# Patient Record
Sex: FEMALE | Race: OTHER
[De-identification: names, ages, dates, MRNs, and addresses within clinical notes are randomized per-mention and may not be internally consistent; named-entity substitution may affect disease eponyms.]

---

## 2020-05-19 ENCOUNTER — HOSPITAL ENCOUNTER (EMERGENCY)
Dept: HOSPITAL 47 - EC | Age: 80
Discharge: HOME | End: 2020-05-19
Payer: MEDICARE

## 2020-05-19 VITALS — SYSTOLIC BLOOD PRESSURE: 178 MMHG | HEART RATE: 94 BPM | DIASTOLIC BLOOD PRESSURE: 90 MMHG | TEMPERATURE: 98.6 F

## 2020-05-19 VITALS — RESPIRATION RATE: 18 BRPM
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
## 2020-05-19 DIAGNOSIS — Z23: ICD-10-CM

## 2020-05-19 DIAGNOSIS — S61.411A: ICD-10-CM

## 2020-05-19 DIAGNOSIS — S41.151A: Primary | ICD-10-CM

## 2020-05-19 DIAGNOSIS — Z88.5: ICD-10-CM
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
## 2020-05-19 DIAGNOSIS — S70.01XA: ICD-10-CM

## 2020-05-19 DIAGNOSIS — Z29.14: ICD-10-CM
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
## 2020-05-19 PROCEDURE — 12002 RPR S/N/AX/GEN/TRNK2.6-7.5CM: CPT

## 2020-05-19 PROCEDURE — 96372 THER/PROPH/DIAG INJ SC/IM: CPT

## 2020-05-19 PROCEDURE — 90675 RABIES VACCINE IM: CPT

## 2020-05-19 PROCEDURE — 90471 IMMUNIZATION ADMIN: CPT

## 2020-05-19 PROCEDURE — 73502 X-RAY EXAM HIP UNI 2-3 VIEWS: CPT

## 2020-05-19 PROCEDURE — 99283 EMERGENCY DEPT VISIT LOW MDM: CPT

## 2020-05-19 PROCEDURE — 90715 TDAP VACCINE 7 YRS/> IM: CPT

## 2020-05-19 PROCEDURE — 73130 X-RAY EXAM OF HAND: CPT

## 2020-05-19 PROCEDURE — 90375 RABIES IG IM/SC: CPT

## 2020-05-19 NOTE — XR
EXAMINATION TYPE: XR hand complete RT

 

DATE OF EXAM: 5/19/2020

 

COMPARISON: None

 

HISTORY: Puncture wounds from Cats

 

TECHNIQUE: Three-view right hand

 

FINDINGS: No radiopaque foreign bodies are evident. No acute fractures or dislocations are evident. T
here is soft tissue swelling and some injury along the dorsum of the hand.

 

IMPRESSION:

1.  No acute osseous abnormality right hand.

2. Soft tissue swelling in injury along the dorsum of the hand.

## 2020-05-19 NOTE — XR
EXAMINATION TYPE: XR Hip RT and AP Pelvis

 

DATE OF EXAM: 5/19/2020

 

COMPARISON: None

 

HISTORY: Fall, pain

 

TECHNIQUE: 2 view right hip supplemented with AP pelvis

 

FINDINGS: Femoral heads articulate with the acetabulum. Mild joint space narrowing is present. Symphy
sis pubis and sacroiliac joints are intact. Degenerative changes are at sacroiliac joints.

 

No acute fracture at the right hip is evident.

 

IMPRESSION:

1.  No acute osseous abnormality right hip

## 2020-05-19 NOTE — ED
Wound/Laceration HPI





- General


Chief Complaint: Wound/Laceration


Stated Complaint: Hand laceration


Time Seen by Provider: 05/19/20 10:21


Source: patient, RN notes reviewed


Mode of arrival: ambulatory


Limitations: no limitations





- History of Present Illness


Initial Comments: 





This is a 71-year-old female sent emergency Department chief complaint of fall, 

Bite and scratches.  Patient states she heard a noise around 7 AM this morning 

patient states that she went out slipped on her deck fell to her right side.  

She has minimal right hip pain she is able to family.  Denies any head injury no

loss conscious.  She states she fell rate in the midst of her cat and feral cat 

fighting.  Patient states that she has multiple bite marks, scratch marks to her

right arm.  Patient denies any chest pain or shortness breath.  Patient's unsure

when her last tetanus was.  Patient states that she did thoroughly clean out the

wound.





- Related Data


                                  Previous Rx's











 Medication  Instructions  Recorded


 


Amoxicillin/Potassium Clav 1 tab PO Q12HR #20 tab 05/19/20





[Augmentin 875-125 Tablet]  











                                    Allergies











Allergy/AdvReac Type Severity Reaction Status Date / Time


 


codeine Allergy  Confusion Verified 05/19/20 10:20














Review of Systems


ROS Statement: 


Those systems with pertinent positive or pertinent negative responses have been 

documented in the HPI.





ROS Other: All systems not noted in ROS Statement are negative.





Past Medical History


Past Medical History: Hypertension


History of Any Multi-Drug Resistant Organisms: None Reported


Past Surgical History: Appendectomy, Hysterectomy


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 

kidney stones


Smoking Status: Never smoker


Past Alcohol Use History: None Reported


Past Drug Use History: None Reported





General Exam


Limitations: no limitations


General appearance: alert, in no apparent distress


Head exam: Present: atraumatic, normocephalic, normal inspection


Eye exam: Present: normal appearance, PERRL, EOMI.  Absent: scleral icterus, 

conjunctival injection, periorbital swelling


ENT exam: Present: normal exam, normal oropharynx, mucous membranes moist


Neck exam: Present: normal inspection.  Absent: tenderness, meningismus, 

lymphadenopathy


Respiratory exam: Present: normal lung sounds bilaterally.  Absent: respiratory 

distress, wheezes, rales, rhonchi, stridor


Cardiovascular Exam: Present: regular rate, normal rhythm, normal heart sounds. 

Absent: systolic murmur, diastolic murmur, rubs, gallop, clicks


Extremities exam: Present: other (Right hand there are 3 lacerations to 

approximately 3 cm, and two 2 cm there is minimal bleeding at this time there 

are multiple puncture wounds, scratch marks noted over the right forearm no 

active bleeding patient has full range of motion of the right hand, right 

forearm mild tenderness the right hand neurovascular intact, right hip there is 

minimal tenderness on the posterior aspect, she does have full range of motion 

she is able to ambulate with minimal difficulty.  Neurovascular intact remaining

extremity exam within normal limits.)


Back exam: Present: full ROM.  Absent: tenderness, paraspinal tenderness, 

vertebral tenderness


Neurological exam: Present: alert, oriented X3, CN II-XII intact, reflexes 

normal.  Absent: motor sensory deficit


Skin exam: Present: warm, dry, intact, normal color.  Absent: rash





Course


                                   Vital Signs











  05/19/20





  10:11


 


Temperature 97.9 F


 


Pulse Rate 104 H


 


Respiratory 18





Rate 


 


Blood Pressure 167/82


 


O2 Sat by Pulse 98





Oximetry 














Procedures





- Laceration


  ** Laceration #1


Consent Obtained: verbal consent


Indication: laceration


Site: hand (Right hand)


Size (cm): 3


Description: irregular


Depth: simple, single layer


Anesthetic Used: lidocaine 1%, without epi


Anesthesia Technique: local infiltration


Amount (mls): 3


Pre-repair: wound explored, irrigated extensively, deep structures intact


Type of Sutures: nylon


Size of Sutures: 4-0


Number of Sutures: 3


Technique: simple, interrupted


Patient Tolerated Procedure: well, no complications





  ** Laceration #2


Consent Obtained: verbal consent


Indication: laceration


Site: hand (Right hand)


Size (cm): 2


Description: linear


Depth: simple, single layer


Anesthetic Used: lidocaine 1%, without epi


Anesthesia Technique: local infiltration


Amount (mls): 3


Pre-repair: wound explored, irrigated extensively


Type of Sutures: nylon


Size of Sutures: 4-0


Number of Sutures: 2


Technique: simple, interrupted, running


Patient Tolerated Procedure: well, no complications





  ** Laceration #3


Consent Obtained: verbal consent


Indication: laceration


Site: hand (Right hand)


Size (cm): 2


Description: linear


Depth: simple, single layer


Anesthetic Used: lidocaine 1%, without epi


Anesthesia Technique: local infiltration


Amount (mls): 3


Pre-repair: wound explored, irrigated extensively, deep structures intact


Type of Sutures: nylon


Size of Sutures: 4-0


Number of Sutures: 2


Technique: simple, interrupted


Patient Tolerated Procedure: well, no complications





Medical Decision Making





- Medical Decision Making


79-year-old female presented for fall, cat bite and scratches.  Her wounds were 

thoroughly cleaned, tetanus is updated, 3 lacerations were closed using sutures 

were loosely approximated.  Patient was given rabies vaccine and immunoglobulin.

 Patient was given prescription for additional days for repeat vaccine.  She was

placed on oral antibiotics.  She was given strict return parameters for signs 

and symptoms of infection.








Disposition


Clinical Impression: 


 Cat bite of hand, Laceration of right hand, Contusion of right hip





Disposition: HOME SELF-CARE


Condition: Stable


Instructions (If sedation given, give patient instructions):  Animal Bite (ED), 

Care For Your Stitches (DC)


Additional Instructions: 


Have sutures removed in 10-14 days.  Please return to the Emergency Department 

if symptoms worsen or any other concerns.


Prescriptions: 


Amoxicillin/Potassium Clav [Augmentin 875-125 Tablet] 1 tab PO Q12HR #20 tab


Is patient prescribed a controlled substance at d/c from ED?: No


Referrals: 


Katey Eason MD [Primary Care Provider] - 1-2 days


Time of Disposition: 11:47

## 2020-05-20 ENCOUNTER — HOSPITAL ENCOUNTER (INPATIENT)
Dept: HOSPITAL 47 - EC | Age: 80
LOS: 4 days | Discharge: HOME HEALTH | DRG: 603 | End: 2020-05-24
Attending: INTERNAL MEDICINE | Admitting: INTERNAL MEDICINE
Payer: COMMERCIAL

## 2020-05-20 DIAGNOSIS — L30.9: ICD-10-CM

## 2020-05-20 DIAGNOSIS — Z85.42: ICD-10-CM

## 2020-05-20 DIAGNOSIS — S61.451A: ICD-10-CM

## 2020-05-20 DIAGNOSIS — L40.9: ICD-10-CM

## 2020-05-20 DIAGNOSIS — I10: ICD-10-CM

## 2020-05-20 DIAGNOSIS — Z60.2: ICD-10-CM

## 2020-05-20 DIAGNOSIS — G43.909: ICD-10-CM

## 2020-05-20 DIAGNOSIS — Z79.82: ICD-10-CM

## 2020-05-20 DIAGNOSIS — Z87.891: ICD-10-CM

## 2020-05-20 DIAGNOSIS — Z87.442: ICD-10-CM

## 2020-05-20 DIAGNOSIS — M15.9: ICD-10-CM

## 2020-05-20 DIAGNOSIS — E78.5: ICD-10-CM

## 2020-05-20 DIAGNOSIS — Z11.59: ICD-10-CM

## 2020-05-20 DIAGNOSIS — Z86.19: ICD-10-CM
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
## 2020-05-20 DIAGNOSIS — Z82.3: ICD-10-CM

## 2020-05-20 DIAGNOSIS — S50.811A: ICD-10-CM

## 2020-05-20 DIAGNOSIS — Z90.710: ICD-10-CM

## 2020-05-20 DIAGNOSIS — E11.9: ICD-10-CM

## 2020-05-20 DIAGNOSIS — Z79.899: ICD-10-CM

## 2020-05-20 DIAGNOSIS — L03.113: Primary | ICD-10-CM

## 2020-05-20 DIAGNOSIS — J44.9: ICD-10-CM

## 2020-05-20 DIAGNOSIS — Z90.49: ICD-10-CM

## 2020-05-20 LAB
ANION GAP SERPL CALC-SCNC: 8 MMOL/L
BASOPHILS # BLD AUTO: 0.1 K/UL (ref 0–0.2)
BASOPHILS NFR BLD AUTO: 1 %
BUN SERPL-SCNC: 15 MG/DL (ref 7–17)
CALCIUM SPEC-MCNC: 9.3 MG/DL (ref 8.4–10.2)
CHLORIDE SERPL-SCNC: 103 MMOL/L (ref 98–107)
CO2 SERPL-SCNC: 27 MMOL/L (ref 22–30)
EOSINOPHIL # BLD AUTO: 0.1 K/UL (ref 0–0.7)
EOSINOPHIL NFR BLD AUTO: 1 %
ERYTHROCYTE [DISTWIDTH] IN BLOOD BY AUTOMATED COUNT: 4.29 M/UL (ref 3.8–5.4)
ERYTHROCYTE [DISTWIDTH] IN BLOOD: 13.7 % (ref 11.5–15.5)
GLUCOSE SERPL-MCNC: 116 MG/DL (ref 74–99)
HCT VFR BLD AUTO: 41.4 % (ref 34–46)
HGB BLD-MCNC: 13.1 GM/DL (ref 11.4–16)
LYMPHOCYTES # SPEC AUTO: 2.1 K/UL (ref 1–4.8)
LYMPHOCYTES NFR SPEC AUTO: 18 %
MCH RBC QN AUTO: 30.5 PG (ref 25–35)
MCHC RBC AUTO-ENTMCNC: 31.6 G/DL (ref 31–37)
MCV RBC AUTO: 96.4 FL (ref 80–100)
MONOCYTES # BLD AUTO: 0.7 K/UL (ref 0–1)
MONOCYTES NFR BLD AUTO: 6 %
NEUTROPHILS # BLD AUTO: 8.3 K/UL (ref 1.3–7.7)
NEUTROPHILS NFR BLD AUTO: 72 %
PLATELET # BLD AUTO: 204 K/UL (ref 150–450)
POTASSIUM SERPL-SCNC: 4.4 MMOL/L (ref 3.5–5.1)
SODIUM SERPL-SCNC: 138 MMOL/L (ref 137–145)
WBC # BLD AUTO: 11.5 K/UL (ref 3.8–10.6)

## 2020-05-20 PROCEDURE — 87635 SARS-COV-2 COVID-19 AMP PRB: CPT

## 2020-05-20 PROCEDURE — 85027 COMPLETE CBC AUTOMATED: CPT

## 2020-05-20 PROCEDURE — 86140 C-REACTIVE PROTEIN: CPT

## 2020-05-20 PROCEDURE — 80048 BASIC METABOLIC PNL TOTAL CA: CPT

## 2020-05-20 PROCEDURE — 85025 COMPLETE CBC W/AUTO DIFF WBC: CPT

## 2020-05-20 PROCEDURE — 87040 BLOOD CULTURE FOR BACTERIA: CPT

## 2020-05-20 PROCEDURE — 85652 RBC SED RATE AUTOMATED: CPT

## 2020-05-20 PROCEDURE — 83605 ASSAY OF LACTIC ACID: CPT

## 2020-05-20 PROCEDURE — 93005 ELECTROCARDIOGRAM TRACING: CPT

## 2020-05-20 PROCEDURE — 90675 RABIES VACCINE IM: CPT

## 2020-05-20 PROCEDURE — 99284 EMERGENCY DEPT VISIT MOD MDM: CPT

## 2020-05-20 PROCEDURE — 36415 COLL VENOUS BLD VENIPUNCTURE: CPT

## 2020-05-20 PROCEDURE — 96365 THER/PROPH/DIAG IV INF INIT: CPT

## 2020-05-20 RX ADMIN — CEFAZOLIN SCH: 330 INJECTION, POWDER, FOR SOLUTION INTRAMUSCULAR; INTRAVENOUS at 11:02

## 2020-05-20 RX ADMIN — OXYCODONE AND ACETAMINOPHEN PRN EACH: 5; 325 TABLET ORAL at 12:24

## 2020-05-20 RX ADMIN — OXYCODONE AND ACETAMINOPHEN PRN EACH: 5; 325 TABLET ORAL at 20:32

## 2020-05-20 RX ADMIN — AMPICILLIN SODIUM AND SULBACTAM SODIUM SCH MLS/HR: 2; 1 INJECTION, POWDER, FOR SOLUTION INTRAMUSCULAR; INTRAVENOUS at 17:28

## 2020-05-20 RX ADMIN — AMPICILLIN SODIUM AND SULBACTAM SODIUM SCH MLS/HR: 2; 1 INJECTION, POWDER, FOR SOLUTION INTRAMUSCULAR; INTRAVENOUS at 23:39

## 2020-05-20 RX ADMIN — HEPARIN SODIUM SCH UNIT: 5000 INJECTION, SOLUTION INTRAVENOUS; SUBCUTANEOUS at 20:33

## 2020-05-20 SDOH — SOCIAL STABILITY - SOCIAL INSECURITY: PROBLEMS RELATED TO LIVING ALONE: Z60.2

## 2020-05-20 NOTE — P.CNOR
History of Present Illness





- [facility]


Consult date: 05/20/20


History of present illness: 


This patient is a 79-year-old female The presented to [facility] ER 

yesterday with complaints of increasing right hand Pain and swelling.  The 

patient initially presented to the ER yesterday, after being bit by a stray cat.

 She states he stray cat and her own cat got into a fight, and she fell in betw

een them and the stray cat began scratching her right hand.  Upon presentation 

to the ER, her wounds were irrigated and closed, rabies and tetanus vaccine were

updated.  The patient was discharged on Augmentin and instructed to return if 

symptoms worsen.  Patient states that this morning she developed significant 

redness and warmth, as well as swelling and pain to the right hand.  Therefore, 

she returned to the emergency department for evaluation. Patient was afebrile 

upon presentation, white blood cell count 11.5, ESR 74, CRP 47.4. The patient 

was admitted under the care by internal medicine with consult with orthopedic 

surgery. 


At the time of my exam, the patient is complaining of isolated right hand pain. 

She states she is also experiencing back pain, although this is chronic and is u

nchanged from her fall.  She states otherwise she feels well.  She denies chest 

pain, shortness of breath, nausea, vomiting, fevers, chills.  She denies loss of

appetite.  Vital signs stable.








Past Medical History


Past Medical History: Cancer, COPD, Hyperlipidemia, Hypertension, Osteoarthritis

(OA), Skin Disorder


Additional Past Medical History / Comment(s): kidney stones, psoriasis, uterine 

CA


History of Any Multi-Drug Resistant Organisms: None Reported


Past Surgical History: Appendectomy, Hysterectomy


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 

kidney stones


Past Anesthesia/Blood Transfusion Reactions: No Reported Reaction


Past Psychological History: No Psychological Hx Reported


Smoking Status: Former smoker


Past Alcohol Use History: None Reported


Past Drug Use History: None Reported





- Past Family History


  ** Mother


Family Medical History: Cancer


Additional Family Medical History / Comment(s): brain tumor





  ** Father


Family Medical History: CVA/TIA





Medications and Allergies


                                Home Medications











 Medication  Instructions  Recorded  Confirmed  Type


 


Amoxicillin/Potassium Clav 1 tab PO Q12HR #20 tab 05/19/20 05/20/20 Rx





[Augmentin 875-125 Tablet]    


 


Aspirin EC [Ecotrin] 325 mg PO DAILY PRN 05/20/20 05/20/20 History


 


Cetirizine HCl 10 mg PO DAILY 05/20/20 05/20/20 History








                                    Allergies











Allergy/AdvReac Type Severity Reaction Status Date / Time


 


codeine Allergy  Confusion Verified 05/20/20 10:57














Physical Examination


On examination, the patient is sitting in bed in no apparent distress.  She is 

alert and oriented 3.  Her head appears normocephalic and traumatic.  Her 

breathing appears unlabored.  On inspection of her right upper extremity, there 

are multiple superficial lacerations to the dorsal forearm. On inspection of the

 right hand, there Is a laceration between the bases of the middle finger and 

ring finger with intact nylon sutures. There are also 2 additional lacerations 

to the more proximal dorsum of the hand with intact nylon sutures.  The skin 

bridge between these 2 lacerations appears nonviable. 


A very small amount of serosanguineous drainage present. There is diffuse 

swelling, erythema, and warmth of the dorsum of the hand and wrist with 

extension into the dorsal forearm. There are no palpable areas of fluctuance or 

areas suspicious for abscess or fluid collection. 


Radial pulse +2. The hand is warm and well perfused. Motor and sensory function 

intact. No pain with PROM of the right shoulder, elbow, wrist, or fingers.  








Results


Right hand x-ray 5/19/20: No acute fractures or foreign bodies. 








- Labs


Labs: 


                  Abnormal Lab Results - Last 24 Hours (Table)











  05/20/20 05/20/20 Range/Units





  09:25 09:25 


 


WBC  11.5 H   (3.8-10.6)  k/uL


 


Neutrophils #  8.3 H   (1.3-7.7)  k/uL


 


ESR  74 H   (0-20)  mm/hr


 


Glucose   116 H  (74-99)  mg/dL


 


C-Reactive Protein   47.4 H  (<10.0)  mg/L








                                      H & H











  05/20/20 Range/Units





  09:25 


 


Hgb  13.1  (11.4-16.0)  gm/dL


 


Hct  41.4  (34.0-46.0)  %











Result Diagrams: 


                                 05/20/20 09:25





                                 05/20/20 09:25





Assessment and Plan


Assessment: 


Multiple cat bites right hand, with surrounding cellulitis





Plan: 


- The clinical findings were discussed with the patient.  The patient was 

discussed with Dr. Haider.  No surgical intervention is planned at this time.


 - Recommend continuation of IV antibiotics under the discretion of Dr. Nguyen. 


 - We will continue to follow patient very closely and re-evaluate her clinical 

course in the morning.

## 2020-05-20 NOTE — ED
General Adult HPI





- General


Chief complaint: Skin/Abscess/Foreign Body


Stated complaint: Revisit/cat bite


Time Seen by Provider: 05/20/20 08:56


Source: patient


Mode of arrival: ambulatory


Limitations: no limitations





- History of Present Illness


Initial comments: 


Dictation was produced using dragon dictation software. please excuse any 

grammatical, word or spelling errors. 





This patient was cared for during a federal and state declared state of e

mergency secondary to Covid 19





Chief Complaint: 79-year-old female past surgical history of COPD presents with 

worsening right hand pain.





History of Present Illness: Patient is 79-year-old female she was attacked by a 

stray cat yesterday morning after there was a confrontation between her cat and 

the stray cat.  Patient states she was bitten and scratched several times to the

right upper extremity.  Patient has had severe extremity infection in the past 

that she reports almost lead to an amputation.  She was seen here yesterday 

provided with antibiotics and rabies treatment.  She was told to return to the 

emergency Department with any worsening symptoms.  Since being discharged she 

states that her right hand has been getting more swollen making it difficult for

her to close her fingers.  She complains of increased pain to the dorsum of the 

right hand.  She denies any constitutional symptoms.








The ROS documented in this emergency department record has been reviewed and 

confirmed by me.  Those systems with pertinent positive or negative responses 

have been documented in the HPI.  All other systems are other negative and/or 

noncontributory.








PHYSICAL EXAM:


General Impression: Alert and oriented x3, not in acute distress


HEENT: Normocephalic atraumatic, extra-ocular movements intact, pupils equal and

reactive to light bilaterally, mucous membranes moist.


Cardiovascular: Heart regular rate and rhythm


Chest: Able to complete full sentences, no retractions, no tachypnea


Abdomen: abdomen soft, non-tender, non-distended, no organomegaly


Musculoskeletal: Pulses present and equal in all extremities, no peripheral 

edema


Motor:  no focal deficits noted


Neurological: CN II-XII grossly intact, no focal motor or sensory deficits noted


Skin: Intact with no visualized rashes


Right upper extremity: Lacerations are clean and dry, dorsum of the right hand 

is erythematous and warm to the touch swelling.  There is several superficial 

abrasions to the right forearm.  There is no lymphadenopathy to the axillary 

area


Psych: Normal affect and mood





ED course: 79 y Old female with cellulitic changes to the right hand after bite 

injury from a cat history.  Vital signs upon arrival are within except limits.  

Clinical presentation consistent with animal bite cellulitis.


Laboratory evaluation obtained.  Leukocytosis 11.5.  ESR 74, glucose 116, C-

reactive protein of 47.4.  Considering elevation of inflammatory markers patient

be admitted for IV antibiotics and medical monitoring.  Discussed patient case 

with Dr. Jasso who is acceptable of patient's care.  He requested infectious 

disease be on consult.  Patient was given Unasyn IV.  She is agreeable with 

disposition.





EKG interpretation: Ventricular rate 91, normal sinus rhythm, , QRS 90, 

QTc 462. No VT prolongation, no QTC prolongation, no ST or T-wave changes noted.

   Overall, this EKG is unremarkable














- Related Data


                                  Previous Rx's











 Medication  Instructions  Recorded


 


Amoxicillin/Potassium Clav 1 tab PO Q12HR #20 tab 05/19/20





[Augmentin 875-125 Tablet]  











                                    Allergies











Allergy/AdvReac Type Severity Reaction Status Date / Time


 


codeine Allergy  Confusion Verified 05/20/20 08:52














Review of Systems


ROS Statement: 


Those systems with pertinent positive or pertinent negative responses have been 

documented in the HPI.





ROS Other: All systems not noted in ROS Statement are negative.





Past Medical History


Past Medical History: Hypertension


Additional Past Medical History / Comment(s): kidney stones


History of Any Multi-Drug Resistant Organisms: None Reported


Past Surgical History: Appendectomy, Hysterectomy


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 

kidney stones


Smoking Status: Never smoker


Past Alcohol Use History: None Reported


Past Drug Use History: None Reported





General Exam


Limitations: no limitations





Course


                                   Vital Signs











  05/20/20 05/20/20





  08:50 10:00


 


Temperature 99.0 F 99.0 F


 


Pulse Rate 98 89


 


Respiratory 18 18





Rate  


 


Blood Pressure 180/98 156/88


 


O2 Sat by Pulse 96 98





Oximetry  














Medical Decision Making





- Lab Data


Result diagrams: 


                                 05/20/20 09:25





                                 05/20/20 09:25


                                   Lab Results











  05/20/20 05/20/20 05/20/20 Range/Units





  09:25 09:25 09:25 


 


WBC  11.5 H    (3.8-10.6)  k/uL


 


RBC  4.29    (3.80-5.40)  m/uL


 


Hgb  13.1    (11.4-16.0)  gm/dL


 


Hct  41.4    (34.0-46.0)  %


 


MCV  96.4    (80.0-100.0)  fL


 


MCH  30.5    (25.0-35.0)  pg


 


MCHC  31.6    (31.0-37.0)  g/dL


 


RDW  13.7    (11.5-15.5)  %


 


Plt Count  204    (150-450)  k/uL


 


Neutrophils %  72    %


 


Lymphocytes %  18    %


 


Monocytes %  6    %


 


Eosinophils %  1    %


 


Basophils %  1    %


 


Neutrophils #  8.3 H    (1.3-7.7)  k/uL


 


Lymphocytes #  2.1    (1.0-4.8)  k/uL


 


Monocytes #  0.7    (0-1.0)  k/uL


 


Eosinophils #  0.1    (0-0.7)  k/uL


 


Basophils #  0.1    (0-0.2)  k/uL


 


ESR  74 H    (0-20)  mm/hr


 


Sodium   138   (137-145)  mmol/L


 


Potassium   4.4   (3.5-5.1)  mmol/L


 


Chloride   103   ()  mmol/L


 


Carbon Dioxide   27   (22-30)  mmol/L


 


Anion Gap   8   mmol/L


 


BUN   15   (7-17)  mg/dL


 


Creatinine   0.96   (0.52-1.04)  mg/dL


 


Est GFR (CKD-EPI)AfAm   66   (>60 ml/min/1.73 sqM)  


 


Est GFR (CKD-EPI)NonAf   57   (>60 ml/min/1.73 sqM)  


 


Glucose   116 H   (74-99)  mg/dL


 


Plasma Lactic Acid Nando    1.5  (0.7-2.0)  mmol/L


 


Calcium   9.3   (8.4-10.2)  mg/dL


 


C-Reactive Protein   47.4 H   (<10.0)  mg/L














Disposition


Clinical Impression: 


 Cellulitis





Disposition: ADMITTED AS IP TO THIS HOSP


Condition: Fair


Referrals: 


Percy Young MD [Primary Care Provider] - 1-2 days


Decision Time: 10:54

## 2020-05-20 NOTE — P.HPIM
History of Present Illness


H&P Date: 20








This is a 79 year old  female patient of Dr. Young with past medical h

istory of shingles, borderline hypertension, hyperlipidemia on diet control, 

COPD, history of uterine cancer status post hysterectomy at age 29, generalized 

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 

history of migraine headaches resolved after menopause, remote history of 

tobacco use and dependence.  The patient states that yesterday morning she 

stepped out of her house and there was a feral cat outside and her own cat in 

the house.  She ended up falling as a slip and fall and the cats attacked each 

other.  She was in the middle of it ended up getting bitten and scratched on the

right arm, forearm and hand.  She initially came into [facility] emergency center yesterday and had stitches placed, tetanus status 

updated, received her first dose of rabies and started on Augmentin for home.  

Patient was discharged home but she continued to have increasing redness and 

swelling to the right hand and forearm.  Patient denies any loss of conscious

ness with fall, no head injury.  She does complain of some bruising and 

generalized aches and pains around her posterior ribs and lower back area.  She 

does have pain with movement of bilateral hips.  Pelvic and right hip x-ray 

showed no acute fracture on initial presentation.  Hand x-ray also showed no 

acute osseous abnormality.  Patient was started on Unasyn and admitted to the 

[facility] floor with consult for Dr. Nguyen and we have added in a consult with Dr. David Haro.











Review of Systems


Constitutional: Denies chills, Denies fever, Denies poor appetite, Denies weight

loss


Eyes: denies blurred vision, denies pain


Ears, nose, mouth and throat: Denies dysphagia, Denies nasal congestion, Denies 

nasal discharge, Denies vertigo


Cardiovascular: Denies chest pain, Denies decreased exercise tolerance, Denies 

dyspnea on exertion, Denies edema, Denies leg edema, Denies lightheadedness, 

Denies shortness of breath, Denies syncope


Respiratory: Denies cough, Denies cough with sputum, Denies dyspnea, Denies 

excessive sputum, Denies hemoptysis, Denies home oxygen, Denies respiratory 

infections, Denies sleep apnea


Gastrointestinal: Denies abdominal pain, Denies diarrhea, Denies loss of 

appetite, Denies nausea, Denies vomiting


Genitourinary: Denies dysuria, Denies hematuria, Denies urgency, Denies urinary 

frequency


Menstruation: Reports postmenopausal


Musculoskeletal: Denies frequent falls, Denies gait dysfunction, Denies muscle 

weakness, Denies myalgias


Integumentary: Reports wounds, Denies pruritus, Denies rash


Neurological: Denies change in mentation, Denies change in speech, Denies gait 

dysfunction, Denies numbness, Denies seizures, Denies weakness


Psychiatric: Denies anxiety, Denies depression


Endocrine: Denies fatigue, Denies weight change





Past Medical History


Past Medical History: Cancer, COPD, Hyperlipidemia, Hypertension, Osteoarthritis

(OA), Skin Disorder


Additional Past Medical History / Comment(s): kidney stones


History of Any Multi-Drug Resistant Organisms: None Reported


Past Surgical History: Appendectomy, Hysterectomy


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

by Dr. Lynn in at [facility] in [place], kidney stones


Smoking Status: Never smoker


Past Alcohol Use History: None Reported


Additional Past Alcohol Use History / Comment(s): Patient was a smoker of one 

pack per day for 30 years, rare alcohol use, no marijuana or illicit drug use.  

Patient is  and lives alone.  She is retired counselor at a shelter for 

domestic violence.


Past Drug Use History: None Reported





- Past Family History


  ** Mother


Family Medical History: Cancer


Additional Family Medical History / Comment(s): Mother  at age 85 from a 

brain tumor.





  ** Father


Family Medical History: CVA/TIA


Additional Family Medical History / Comment(s): Father  at age 75 from a 

CVA.





  ** Sister(s)


Additional Family Medical History / Comment(s): Patient has 1 sister but she has

no contact with her as her sister stole her identity.





  ** Brother(s)


Additional Family Medical History / Comment(s): Patient does not have any 

brothers.  Patient has 4 children, one  at age 7 weeks, other children with 

no major medical problems.  Patient is also adopted 1 child.





Medications and Allergies


                                Home Medications











 Medication  Instructions  Recorded  Confirmed  Type


 


Amoxicillin/Potassium Clav 1 tab PO Q12HR #20 tab 20 Rx





[Augmentin 875-125 Tablet]    


 


Aspirin EC [Ecotrin] 325 mg PO DAILY PRN 20 History


 


Cetirizine HCl 10 mg PO DAILY 20 History








                                    Allergies











Allergy/AdvReac Type Severity Reaction Status Date / Time


 


codeine Allergy  Confusion Verified 20 10:57














Physical Exam


Vitals: 


                                   Vital Signs











  Temp Pulse Resp BP Pulse Ox


 


 20 10:00  99.0 F  89  18  156/88  98


 


 20 08:50  99.0 F  98  18  180/98  96








                                Intake and Output











 20





 22:59 06:59 14:59


 


Other:   


 


  Weight   90.718 kg

















Gen: This is a 79-year-old  female.  Patient is resting on the ear 

structure and appears to be comfortable and in no acute distress.


HEENT: Head is atraumatic, normocephalic. Pupils equal, round. Sclerae is anict

soren. 


NECK: Supple. No JVD. No lymphadenopathy. No thyromegaly. 


LUNGS: Clear to auscultation. No wheezes or rhonchi.  No intercostal 

retractions.


HEART: Regular rate and rhythm. No murmur. 


ABDOMEN: Soft. Bowel sounds are present. No masses.  No tenderness.


BACK: No ecchymosis noted to the back region.


EXTREMITIES: No pedal edema.  No calf tenderness.  Significant wounds to the 

right hand and right forearm with erythema and edema with decreased range of 

motion to the hand and wrist.  Bruising noted to the left hand but full range of

 motion


NEUROLOGICAL: Patient is awake, alert and oriented x3. Cranial nerves 2 through 

12 are grossly intact. 








Results


CBC & Chem 7: 


                                 20 09:25





                                 20 09:25


Labs: 


                  Abnormal Lab Results - Last 24 Hours (Table)











  20 Range/Units





  09:25 09:25 


 


WBC  11.5 H   (3.8-10.6)  k/uL


 


Neutrophils #  8.3 H   (1.3-7.7)  k/uL


 


ESR  74 H   (0-20)  mm/hr


 


Glucose   116 H  (74-99)  mg/dL


 


C-Reactive Protein   47.4 H  (<10.0)  mg/L














Thrombosis Risk Factor Assmnt





- DVT/VTE Prophylaxis


DVT/VTE Prophylaxis: Pharmacologic Prophylaxis ordered





Assessment and Plan


Plan: 





1.  Multiple cat bites and scratches to the right hand and forearm with 

surrounding cellulitis with sutures in place.  Patient received her first dose 

of rabies, tetanus status has been updated.  Patient failed outpatient 

treatment.  Consult with orthopedic hand surgeon.





2.  Borderline hypertension, presented with hypertension.  Patient will be 

started on lisinopril 5 mg daily.





3.  Borderline diabetes mellitus type 2.  Diet will be changed to consistent 

carb.





4.  Hyperlipidemia, diet controlled.





5.  COPD, stable without exacerbation.





6.  History of uterine cancer status post hysterectomy.





7.  History of fallopian tube tumor status post resection.





8.  History of shingles, stable.





9.  Remote history of tobacco use and dependence.





10.  DVT prophylaxis.  Heparin subcu.





11.  GI prophylaxis.  Pepcid.





11.  COVID-19 testing.





Patient will be admitted to the hospital for a minimum of 2 night stay.





Discharge plan: Most likely return home.





Impression and plan of care have been directed as dictated by the signing 

physician.  Cyndi Red nurse practitioner acting as scribe for signing 

physician.

## 2020-05-21 RX ADMIN — OXYCODONE AND ACETAMINOPHEN PRN EACH: 5; 325 TABLET ORAL at 05:48

## 2020-05-21 RX ADMIN — FAMOTIDINE SCH MG: 20 TABLET, FILM COATED ORAL at 07:13

## 2020-05-21 RX ADMIN — AMPICILLIN SODIUM AND SULBACTAM SODIUM SCH MLS/HR: 2; 1 INJECTION, POWDER, FOR SOLUTION INTRAMUSCULAR; INTRAVENOUS at 11:33

## 2020-05-21 RX ADMIN — OXYCODONE AND ACETAMINOPHEN PRN EACH: 5; 325 TABLET ORAL at 23:27

## 2020-05-21 RX ADMIN — LORATADINE SCH MG: 10 TABLET ORAL at 07:13

## 2020-05-21 RX ADMIN — AMPICILLIN SODIUM AND SULBACTAM SODIUM SCH MLS/HR: 2; 1 INJECTION, POWDER, FOR SOLUTION INTRAMUSCULAR; INTRAVENOUS at 05:33

## 2020-05-21 RX ADMIN — AMPICILLIN SODIUM AND SULBACTAM SODIUM SCH MLS/HR: 2; 1 INJECTION, POWDER, FOR SOLUTION INTRAMUSCULAR; INTRAVENOUS at 17:14

## 2020-05-21 RX ADMIN — LISINOPRIL SCH MG: 5 TABLET ORAL at 07:13

## 2020-05-21 RX ADMIN — HEPARIN SODIUM SCH UNIT: 5000 INJECTION, SOLUTION INTRAVENOUS; SUBCUTANEOUS at 07:13

## 2020-05-21 RX ADMIN — OXYCODONE AND ACETAMINOPHEN PRN EACH: 5; 325 TABLET ORAL at 17:16

## 2020-05-21 RX ADMIN — HEPARIN SODIUM SCH UNIT: 5000 INJECTION, SOLUTION INTRAVENOUS; SUBCUTANEOUS at 20:33

## 2020-05-21 RX ADMIN — AMPICILLIN SODIUM AND SULBACTAM SODIUM SCH MLS/HR: 2; 1 INJECTION, POWDER, FOR SOLUTION INTRAMUSCULAR; INTRAVENOUS at 23:26

## 2020-05-21 RX ADMIN — CEFAZOLIN SCH: 330 INJECTION, POWDER, FOR SOLUTION INTRAMUSCULAR; INTRAVENOUS at 11:06

## 2020-05-21 NOTE — P.PN
Subjective


Progress Note Date: 05/21/20





This is a 79 year old  female patient of Dr. Young with past medical 

history of shingles, borderline hypertension, hyperlipidemia on diet control, 

COPD, history of uterine cancer status post hysterectomy at age 29, generalized 

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 

history of migraine headaches resolved after menopause, remote history of 

tobacco use and dependence.  The patient states that yesterday morning she 

stepped out of her house and there was a feral cat outside and her own cat in 

the house.  She ended up falling as a slip and fall and the cats attacked each 

other.  She was in the middle of it ended up getting bitten and scratched on the

right arm, forearm and hand.  She initially came into [facility] emergency center yesterday and had stitches placed, tetanus status 

updated, received her first dose of rabies and started on Augmentin for home.  

Patient was discharged home but she continued to have increasing redness and 

swelling to the right hand and forearm.  Patient denies any loss of 

consciousness with fall, no head injury.  She does complain of some bruising and

generalized aches and pains around her posterior ribs and lower back area.  She 

does have pain with movement of bilateral hips.  Pelvic and right hip x-ray s

howed no acute fracture on initial presentation.  Hand x-ray also showed no 

acute osseous abnormality.  Patient was started on Unasyn and admitted to the 

[facility] floor with consult for Dr. Nguyen and we have added in a consult with Dr. David Haro.





5/21: Patient has been seen by Dr. Nguyen and continued on Unasyn.  Patient also 

seen by orthopedics with no lymph or surgical intervention.  Patient's erythema 

and edema is improved from yesterday.  Dressing is in place.  Rabies, second 

dose, is due tomorrow which will be ordered.  We'll make sure outpatient rabies 

vaccines are ordered prior to her discharge.  Anticipate discharge in the next 

24-48 hours.  Patient is afebrile, heart rate 87, blood pressure 145/53, pulse 

ox 97% on room air.














Objective





- Vital Signs


Vital signs: 


                                   Vital Signs











Temp  99 F   05/21/20 07:07


 


Pulse  87   05/21/20 07:07


 


Resp  16   05/21/20 07:07


 


BP  145/53   05/21/20 07:07


 


Pulse Ox  97   05/21/20 07:07








                                 Intake & Output











 05/20/20 05/21/20 05/21/20





 18:59 06:59 18:59


 


Weight 90.718 kg  


 


Other:   


 


  # Voids 2 1 














- Exam





Review of Systems


Constitutional: Denies chills, Denies fever, Denies poor appetite, Denies weight

loss


Ears, nose, mouth and throat: Denies dysphagia, Denies nasal congestion, Denies 

nasal discharge, Denies vertigo


Cardiovascular: Denies chest pain, Denies decreased exercise tolerance, Denies 

dyspnea on exertion, Denies edema, Denies leg edema, Denies lightheadedness, 

Denies shortness of breath, Denies syncope


Respiratory: Denies cough, Denies cough with sputum, Denies dyspnea, Denies 

excessive sputum, Denies hemoptysis, Denies home oxygen, Denies respiratory 

infections, Denies sleep apnea


Gastrointestinal: Denies abdominal pain, Denies diarrhea, Denies loss of 

appetite, Denies nausea, Denies vomiting


Genitourinary: Denies dysuria, Denies hematuria, Denies urgency, Denies urinary 

frequency


Menstruation: Reports postmenopausal


Musculoskeletal: Denies frequent falls, Denies gait dysfunction, Denies muscle 

weakness, Denies myalgias


Integumentary: Reports wounds, Denies pruritus, Denies rash


Neurological: Denies change in mentation, Denies change in speech, Denies gait 

dysfunction, Denies numbness, Denies seizures, Denies weakness


Psychiatric: Denies anxiety, Denies depression


Endocrine: Denies fatigue, Denies weight change








Physical examination


Gen: This is a 79-year-old  female.  Patient is resting on the ear 

structure and appears to be comfortable and in no acute distress.


HEENT: Head is atraumatic, normocephalic. Pupils equal, round. Sclerae is 

anicteric. 


NECK: Supple. No JVD. No lymphadenopathy. No thyromegaly. 


LUNGS: Clear to auscultation. No wheezes or rhonchi.  No intercostal 

retractions.


HEART: Regular rate and rhythm. No murmur. 


ABDOMEN: Soft. Bowel sounds are present. No masses.  No tenderness.


BACK: No ecchymosis noted to the back region.


EXTREMITIES: No pedal edema.  No calf tenderness.  Significant wounds to the 

right hand and right forearm with erythema and edema which are both improved 

from yesterday.  Patient has minimal range of motion to the hand and wrist. 

Bruising noted to the left hand but full range of motion


NEUROLOGICAL: Patient is awake, alert and oriented x3. Cranial nerves 2 through 

12 are grossly intact. 








- Labs


CBC & Chem 7: 


                                 05/20/20 09:25





                                 05/20/20 09:25


Labs: 


                  Abnormal Lab Results - Last 24 Hours (Table)











  05/20/20 05/20/20 Range/Units





  09:25 09:25 


 


WBC  11.5 H   (3.8-10.6)  k/uL


 


Neutrophils #  8.3 H   (1.3-7.7)  k/uL


 


ESR  74 H   (0-20)  mm/hr


 


Glucose   116 H  (74-99)  mg/dL


 


C-Reactive Protein   47.4 H  (<10.0)  mg/L














Assessment and Plan


Plan: 





1.  Multiple cat bites and scratches to the right hand and forearm with 

surrounding cellulitis with sutures in place.  Patient received her first dose 

of rabies, tetanus status has been updated.  Patient failed outpatient 

treatment.  Consults with orthopedic surgeon and Dr. Nguyen appreciated.  Patient

is due for second dose of rabies vaccine tomorrow which will be ordered.  Case 

manager contacted to arrange for outpatient vaccines 4 days 7 and 14.





2.  Borderline hypertension, presented with hypertension.  Patient will be 

started on lisinopril 5 mg daily.





3.  Borderline diabetes mellitus type 2.  Diet will be changed to consistent 

carb.





4.  Hyperlipidemia, diet controlled.





5.  COPD, stable without exacerbation.





6.  History of uterine cancer status post hysterectomy.





7.  History of fallopian tube tumor status post resection.





8.  History of shingles, stable.





9.  Remote history of tobacco use and dependence.





10.  DVT prophylaxis.  Heparin subcu.





11.  GI prophylaxis.  Pepcid.





11.  COVID-19 infection not present.











Discharge plan: Most likely return home.





Impression and plan of care have been directed as dictated by the signing 

physician.  Cyndi Red nurse practitioner acting as scribe for signing 

physician.

## 2020-05-21 NOTE — P.PN
Progress Note - Text


Progress Note Date: 05/21/20





Orthopedics:








History of present illness:





Patient is a very pleasant 79-year-old female who is seen and examined at the 

bedside for further evaluation in regards to right hand pain and swelling.  She 

sustained injuries to her right forearm and right hand after being bitten by a 

stray cat.  She had been seen in emergency department on 05/19/2020 and was 

prescribed an antibiotic medication.  She had multiple sutures placed over 

reamed wounds to the posterior hand at that time.  Her symptoms continued to 

worsen and she returned to the emergency room yesterday, 05/20/2020.  She was 

diagnosed with cellulitis of the right hand.  She is evaluated by infectious 

disease yesterday.  She was started on Unasyn IV.  Since that time, she has had 

significant improvement overall.  She states she has better range of motion of 

the right hand.  She has less swelling in the right hand.  Her pain is been 

better controlled with the right hand.  She states she has been seen by Dr. Nguyen today who is happy with her progress with IV antibiotics.  She'll plan to 

continue the IV antibiotics.  She continues to improve, she may plan for 

discharge home tomorrow, 05/22/2020, or Saturday, 05/23/2020.  Patient states 

she doesn't have much of an appetite but has been eating.  She feels generally 

sore over the whole ordeal with a cat bite but does not complain of any other 

specific pain.  Patient's past medical history does include COPD, 

hyperlipidemia, hypertension, and cancer.








Physical Exam:





Patient is awake, alert, and oriented 3


Vital signs stable


Good chest excursion with deep inspiration and expiration


Dressing over the left hand is removed during physical examination


Evidence of improvement of the swelling over the left posterior hand


Some swelling continues to be present over the fingers of the left hand


Evidence of some bruising over the proximal phalanges of the right middle finger

and ring fingers


Evidence of 3 wounds over the posterior right hand in which the previously 

placed sutures remain intact


Some bruising over the right posterior hand


Multiple superficial wounds over the right forearm are scabbed over without any 

active drainage


Evidence of some bruising over the underside of the right forearm


Patient is able to perform some active range of motion of the fingers of the 

right hand in right wrist without significant difficulty


Dressing is reapplied after physical examination the right hand with nonstick 

Telfa and stretch wrap








Assessment:


Status post cat bite to the right hand


Status post cat scratches to the right forearm


Right hand cellulitis


Right hand pain


Evidence of 3 wounds to the right posterior hand with sutures intact


History of COPD


Hyperlipidemia, hypertension, history of cancer








Plan:


1.  Since being admitted to the hospital and started on IV antibiotic medic

ation, the patient's cellulitis and swelling of the right hand has had 

improvement.  She has better range of motion of the right hand, fingers the 

right hand, and right wrist.  She feels her pain is better controlled.  She is 

not currently having any significant active drainage from her wound sites.  The 

wounds over the posterior right hand have remained closed with previously placed

suture.  The superficial wounds over the right forearm are scabbed over without 

active drainage.  She is not currently having any cellulitis extending up into 

the forearm.  At this time, we'll plan to continue conservative treatment with 

dressing changes as needed over the right hand.  We recommend continuing with IV

antibiotics as set forth by Dr. Nguyen in infectious disease.  We will continue 

to follow the patient closely.  If the patient continues to improve we will plan

to clear the patient for discharge home with appropriate antibiotic medication 

as prescribed by Dr. Nguyen in infectious disease.  At the time of discharge, we 

will plan outpatient follow-up evaluation in the outpatient setting in 

approximately 1 week.  Patient will plan to follow up with Dr. Haider at 

[facility].


2.  Patient will continue be seen in exam by medicine and infectious disease

## 2020-05-21 NOTE — PN
PROGRESS NOTE



DATE OF SERVICE:

05/21/2020



REASON FOR FOLLOWUP:

Right hand cat bite abscess cellulitis.



INTERVAL HISTORY:

The patient is currently afebrile.  Patient is breathing comfortably.  Discomfort to

the right hand is slightly decreased.  No chest pain, shortness of breath or cough.  No

abdominal pain or diarrhea.



PHYSICAL EXAMINATION:

Blood pressure 138/79 with a pulse of 87, temperature 98.1.  She is 96% on room air.

General description:  The patient is an elderly female lying in bed in no distress.

Respiratory system: Unlabored breathing.  Clear to auscultation anteriorly.  Heart S1,

S2.  Regular rate and rhythm.  Abdomen soft, no tenderness. Right hand is currently

dressed up.  No obvious drainage on the dressing.



LABS:

Blood culture negative.  No CBC was done today.



DIAGNOSTIC IMPRESSION AND PLAN:

Patient with right hand and forearm cat bite abscess and cellulitis, patient failing

outpatient oral Augmentin.  Currently covered with Unasyn to continue.  Re-evaluate the

wound tomorrow and monitor clinical course closely.





MMODL / IJN: [id number] / Job#: [id number]

## 2020-05-21 NOTE — P.CONS
History of Present Illness





- Reason for Consult


Consult date: 20


right hand cat bite cellulitis


Requesting physician: Esteban Jasso





- Chief Complaint


right hand pain swelling and redness x 1 day





- History of Present Illness


Patient is a 79-year  female presenting to the ER with chief complaints

of worsening pain swelling redness of the right hand patient apparently did have

a fall yesterday the patient went out to find what was going outside when she 

noticed a fight between 2 cats the patient fell in the middle of those 2 cats 

and 1 of the cat starting to scratch her right hand the patient subsequently 

presented to [facility] ER the patient has been ordered by the ER 

physician patient was has been irrigated and closed representative specimen 

updated and agree discharged home on oral Augmentin however the patient has 

noticed to have worsening swelling and redness of the right forearm and hand 

area patient describes the pain to be more of a throbbing almost 10 out of 10 in

severity with associated swelling redness and some clear drainage on presented 

to the hospital bed to have low-grade fever of 9094 height she did have white 

level 11.5 CRP was 47.4 patient has been admitted to hospital he was started on 

Unasyn infectious disease was consulted for further management of antibiotic 

therapy.








Review of Systems


Positive point has been mentioned in HPI rest of the systems are negative








Past Medical History


Past Medical History: Cancer, COPD, Hyperlipidemia, Hypertension, Osteoarthritis

(OA), Skin Disorder


Additional Past Medical History / Comment(s): kidney stones


History of Any Multi-Drug Resistant Organisms: None Reported


Past Surgical History: Appendectomy, Hysterectomy


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

by Dr. Lynn in at [facility] in [place], kidney stones


Past Anesthesia/Blood Transfusion Reactions: No Reported Reaction


Smoking Status: Never smoker


Past Alcohol Use History: None Reported


Additional Past Alcohol Use History / Comment(s): Patient was a smoker of one 

pack per day for 30 years, rare alcohol use, no marijuana or illicit drug use.  

Patient is  and lives alone.  She is retired counselor at a shelter for 

domestic violence.


Past Drug Use History: None Reported





- Past Family History


  ** Mother


Family Medical History: Cancer


Additional Family Medical History / Comment(s): Mother  at age 85 from a 

brain tumor.





  ** Father


Family Medical History: CVA/TIA


Additional Family Medical History / Comment(s): Father  at age 75 from a 

CVA.





  ** Sister(s)


Additional Family Medical History / Comment(s): Patient has 1 sister but she has

no contact with her as her sister stole her identity.





  ** Brother(s)


Additional Family Medical History / Comment(s): Patient does not have any 

brothers.  Patient has 4 children, one  at age 7 weeks, other children with 

no major medical problems.  Patient is also adopted 1 child.





Medications and Allergies


                                Home Medications











 Medication  Instructions  Recorded  Confirmed  Type


 


Amoxicillin/Potassium Clav 1 tab PO Q12HR #20 tab 20 Rx





[Augmentin 875-125 Tablet]    


 


Aspirin EC [Ecotrin] 325 mg PO DAILY PRN 20 History


 


Cetirizine HCl 10 mg PO DAILY 20 History








                                    Allergies











Allergy/AdvReac Type Severity Reaction Status Date / Time


 


codeine Allergy  Confusion Verified 20 10:57














Physical Exam


Vitals: 


                                   Vital Signs











  Temp Pulse Pulse Resp BP BP Pulse Ox


 


 20 11:45  99.1 F   85  16   189/75  98


 


 20 10:00  99.0 F  89   18  156/88   98


 


 20 08:50  99.0 F  98   18  180/98   96








                                Intake and Output











 20





 22:59 06:59 14:59


 


Other:   


 


  Weight   90.718 kg











GENERAL DESCRIPTION: Elderly female lying in bed, no distress. No tachypnea or 

accessory muscle of respiration use.


HEENT: Shows Pallor , no scleral icterus. Oral mucous membrane is dry.


NECK: Trachea central, no thyromegaly.


LUNGS: Unlabored breathing. Clear to auscultation anteriorly. No wheeze or 

crackle.


HEART: S1, S2, regular rate and rhythm.


ABDOMEN: Soft, no tenderness , guarding or rigidity


EXTREMITIES: Right hand with diffuse swelling and redness with some skin 

necrosis no foul-smelling drainage with some scratch marks on the right forearm 


sKIN: No rash, no masses palpable.


NEUROLOGICAL: The patient is awake, alert, oriented x3, mood and affect normal.

















Results


CBC & Chem 7: 


                                 20 09:25





                                 20 09:25


Labs: 


                  Abnormal Lab Results - Last 24 Hours (Table)











  20 Range/Units





  09:25 09:25 


 


WBC  11.5 H   (3.8-10.6)  k/uL


 


Neutrophils #  8.3 H   (1.3-7.7)  k/uL


 


ESR  74 H   (0-20)  mm/hr


 


Glucose   116 H  (74-99)  mg/dL


 


C-Reactive Protein   47.4 H  (<10.0)  mg/L














Assessment and Plan


Assessment: 


Patient with right hand cat bite cellulitis in this patient who did have 

extensive swelling and redness failing outpatient oral Augmentin more likely 

because of the burden of disease and will need to cover for the polymicrobial 

carlos manuel of the cat health including gram-negative both aerobes and anaerobes








(1) Cellulitis


Current Visit: Yes   Status: Acute   Code(s): L03.90 - CELLULITIS, UNSPECIFIED  

 SNOMED Code(s): 311348658


   





(2) Cat bite of hand


Current Visit: No   Status: Acute   Code(s): S61.459A - OPEN BITE OF UNSPECIFIED

 HAND, INITIAL ENCOUNTER; W55.01XA - BITTEN BY CAT, INITIAL ENCOUNTER   SNOMED 

Code(s): 663682907


   


Plan: 


1-Unasyn 3 g every 6 hours


2-patient need to complete her rabies vaccination


We will follow on clinical condition and cultures to further adjust medication 

if needed


Thank you for this consultation we will follow the patient along with you








Time with Patient: Greater than 30

## 2020-05-22 LAB
ERYTHROCYTE [DISTWIDTH] IN BLOOD BY AUTOMATED COUNT: 3.77 M/UL (ref 3.8–5.4)
ERYTHROCYTE [DISTWIDTH] IN BLOOD: 13.5 % (ref 11.5–15.5)
HCT VFR BLD AUTO: 37 % (ref 34–46)
HGB BLD-MCNC: 11.4 GM/DL (ref 11.4–16)
MCH RBC QN AUTO: 30.4 PG (ref 25–35)
MCHC RBC AUTO-ENTMCNC: 30.9 G/DL (ref 31–37)
MCV RBC AUTO: 98.1 FL (ref 80–100)
PLATELET # BLD AUTO: 180 K/UL (ref 150–450)
WBC # BLD AUTO: 9 K/UL (ref 3.8–10.6)

## 2020-05-22 RX ADMIN — AMPICILLIN SODIUM AND SULBACTAM SODIUM SCH MLS/HR: 2; 1 INJECTION, POWDER, FOR SOLUTION INTRAMUSCULAR; INTRAVENOUS at 23:56

## 2020-05-22 RX ADMIN — OXYCODONE AND ACETAMINOPHEN PRN EACH: 5; 325 TABLET ORAL at 17:09

## 2020-05-22 RX ADMIN — OXYCODONE AND ACETAMINOPHEN PRN EACH: 5; 325 TABLET ORAL at 11:57

## 2020-05-22 RX ADMIN — AMPICILLIN SODIUM AND SULBACTAM SODIUM SCH MLS/HR: 2; 1 INJECTION, POWDER, FOR SOLUTION INTRAMUSCULAR; INTRAVENOUS at 05:40

## 2020-05-22 RX ADMIN — LORATADINE SCH MG: 10 TABLET ORAL at 09:32

## 2020-05-22 RX ADMIN — AMPICILLIN SODIUM AND SULBACTAM SODIUM SCH MLS/HR: 2; 1 INJECTION, POWDER, FOR SOLUTION INTRAMUSCULAR; INTRAVENOUS at 18:44

## 2020-05-22 RX ADMIN — OXYCODONE AND ACETAMINOPHEN PRN EACH: 5; 325 TABLET ORAL at 06:13

## 2020-05-22 RX ADMIN — HEPARIN SODIUM SCH UNIT: 5000 INJECTION, SOLUTION INTRAVENOUS; SUBCUTANEOUS at 09:32

## 2020-05-22 RX ADMIN — FAMOTIDINE SCH MG: 20 TABLET, FILM COATED ORAL at 09:32

## 2020-05-22 RX ADMIN — LISINOPRIL SCH MG: 5 TABLET ORAL at 09:32

## 2020-05-22 RX ADMIN — HEPARIN SODIUM SCH UNIT: 5000 INJECTION, SOLUTION INTRAVENOUS; SUBCUTANEOUS at 20:23

## 2020-05-22 RX ADMIN — AMPICILLIN SODIUM AND SULBACTAM SODIUM SCH MLS/HR: 2; 1 INJECTION, POWDER, FOR SOLUTION INTRAMUSCULAR; INTRAVENOUS at 11:59

## 2020-05-22 NOTE — P.PN
Subjective


Progress Note Date: 05/22/20


Principal diagnosis: 


Right upper extremity cat bite





Patient is a pleasant 71-year-old female seen at bedside this morning.  We are 

following her for cat bite and wounds to the right upper extremity.  She was 

admitted through the emergency department on 05/20/2020.  She had her wounds 

irrigated and closed.  She's been on IV antibiotics.  She has continued to 

improve daily.  She has no new complaints today.  She is denying fever or 

chills.  She has no new numbness or tingling.








Objective





- Vital Signs


Vital signs: 


                                   Vital Signs











Temp  98.4 F   05/22/20 07:09


 


Pulse  80   05/22/20 07:09


 


Resp  16   05/22/20 07:09


 


BP  155/80   05/22/20 07:09


 


Pulse Ox  94 L  05/22/20 07:09








                                 Intake & Output











 05/21/20 05/22/20 05/22/20





 18:59 06:59 18:59


 


Other:   


 


  Voiding Method  Toilet Toilet


 


  # Voids 3 2 














- Exam


Inspection of the right upper extremity shows bandage in place at the right 

hand.  There is no active bleeding or drainage through the bandage.  Bandage is 

taken down.  There are 7 nylon sutures in place.  There is no diffuse erythema, 

swelling, bleeding or drainage.  Neurovascular status is intact throughout the 

hand and digits including motor and sensation.  There are healing scratch wounds

at the dorsum of the right forearm.  There is no progressive erythema, fluid 

collection, bleeding or drainage.  Neurovascular status of the proximal right 

upper extremity is intact as well.  2+ radial pulses present and less than 2 

second capillary refill is present.








- Constitutional


General appearance: Present: no acute distress





- Labs


CBC & Chem 7: 


                                 05/20/20 09:25





                                 05/20/20 09:25


Labs: 


                      Microbiology - Last 24 Hours (Table)











 05/20/20 09:25 Blood Culture - Preliminary





 Blood    No Growth after 24 hours














Assessment and Plan


(1) Cat bite of hand


Narrative/Plan: 


She'll continue with wound care and IV antibiotics per infectious disease.  She 

continues to improve.  I do not see any areas that will require immediate 

surgical intervention.  We will continue to monitor her daily and she may be 

discharged when okay with infectious disease.


Current Visit: No   Status: Acute   Priority: Medium   Code(s): S61.459A - OPEN 

BITE OF UNSPECIFIED HAND, INITIAL ENCOUNTER; W55.01XA - BITTEN BY CAT, INITIAL 

ENCOUNTER   SNOMED Code(s): 408013824


   


Time with Patient: Less than 30

## 2020-05-22 NOTE — P.PN
Subjective


Progress Note Date: 05/22/20





This is a 79 year old  female patient of Dr. Young with past medical 

history of shingles, borderline hypertension, hyperlipidemia on diet control, 

COPD, history of uterine cancer status post hysterectomy at age 29, generalized 

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 

history of migraine headaches resolved after menopause, remote history of 

tobacco use and dependence.  The patient states that yesterday morning she 

stepped out of her house and there was a feral cat outside and her own cat in 

the house.  She ended up falling as a slip and fall and the cats attacked each 

other.  She was in the middle of it ended up getting bitten and scratched on the

right arm, forearm and hand.  She initially came into [facility] emergency center yesterday and had stitches placed, tetanus status 

updated, received her first dose of rabies and started on Augmentin for home.  

Patient was discharged home but she continued to have increasing redness and 

swelling to the right hand and forearm.  Patient denies any loss of 

consciousness with fall, no head injury.  She does complain of some bruising and

generalized aches and pains around her posterior ribs and lower back area.  She 

does have pain with movement of bilateral hips.  Pelvic and right hip x-ray s

howed no acute fracture on initial presentation.  Hand x-ray also showed no 

acute osseous abnormality.  Patient was started on Unasyn and admitted to the 

[facility] floor with consult for Dr. Nguyen and we have added in a consult with Dr. David Haro.





5/21: Patient has been seen by Dr. Nguyen and continued on Unasyn.  Patient also 

seen by orthopedics with no lymph or surgical intervention.  Patient's erythema 

and edema is improved from yesterday.  Dressing is in place.  Rabies, second 

dose, is due tomorrow which will be ordered.  We'll make sure outpatient rabies 

vaccines are ordered prior to her discharge.  Anticipate discharge in the next 

24-48 hours.  Patient is afebrile, heart rate 87, blood pressure 145/53, pulse 

ox 97% on room air.





5/22: Patient received a 3 rabies vaccine.  She continues to show improvement of

redness and swelling to the right hand and forearm.  When Dr. Nguyen and 

orthopedics are following.  Plan is to continue IV antibiotic for another day 

and ask for antibiotic recommendations from Dr. Nguyen with anticipated discharge

home tomorrow.  Patient has been afebrile, heart rate 80, blood pressure 155/80,

pulse ox 94% on room air.  Repeat WBC 9.0














Objective





- Vital Signs


Vital signs: 


                                   Vital Signs











Temp  98.4 F   05/22/20 07:09


 


Pulse  80   05/22/20 07:09


 


Resp  16   05/22/20 07:09


 


BP  155/80   05/22/20 07:09


 


Pulse Ox  94 L  05/22/20 07:09








                                 Intake & Output











 05/21/20 05/22/20 05/22/20





 18:59 06:59 18:59


 


Other:   


 


  Voiding Method  Toilet Toilet


 


  # Voids 3 2 














- Exam





Review of Systems


Constitutional: Denies chills, Denies fever, Denies poor appetite, Denies weight

loss


Ears, nose, mouth and throat: Denies dysphagia, Denies nasal congestion, Denies 

nasal discharge, Denies vertigo


Cardiovascular: Denies chest pain, Denies decreased exercise tolerance, Denies 

dyspnea on exertion, Denies edema, Denies leg edema, Denies lightheadedness, 

Denies shortness of breath, Denies syncope


Respiratory: Denies cough, Denies cough with sputum, Denies dyspnea, Denies 

excessive sputum, Denies hemoptysis, Denies home oxygen, Denies respiratory 

infections, Denies sleep apnea


Gastrointestinal: Denies abdominal pain, Denies diarrhea, Denies loss of 

appetite, Denies nausea, Denies vomiting


Genitourinary: Denies dysuria, Denies hematuria, Denies urgency, Denies urinary 

frequency


Menstruation: Reports postmenopausal


Musculoskeletal: Denies frequent falls, Denies gait dysfunction, Denies muscle 

weakness, Denies myalgias


Integumentary: Reports wounds, reports edemaimproving Denies pruritus, Denies 

rash


Neurological: Denies change in mentation, Denies change in speech, Denies gait 

dysfunction, Denies numbness, Denies seizures, Denies weakness


Psychiatric: Denies anxiety, Denies depression


Endocrine: Denies fatigue, Denies weight change








Physical examination


Gen: This is a 79-year-old  female.  Patient is resting on the edge of 

the bed and appears to be comfortable and in no acute distress.


HEENT: Head is atraumatic, normocephalic. Pupils equal, round. Sclerae is anicte

nu. 


NECK: Supple. No JVD. No lymphadenopathy. No thyromegaly. 


LUNGS: Clear to auscultation. No wheezes or rhonchi.  No intercostal 

retractions.


HEART: Regular rate and rhythm. No murmur. 


ABDOMEN: Soft. Bowel sounds are present. No masses.  No tenderness.


BACK: No ecchymosis noted to the back region.


EXTREMITIES: No pedal edema.  No calf tenderness.  Significant wounds to the 

right hand and right forearm with erythema and edema which are both improved 

from yesterday.  Patient has minimal range of motion to the hand and wrist. 

Bruising noted to the left hand but full range of motion


NEUROLOGICAL: Patient is awake, alert and oriented x3. Cranial nerves 2 through 

12 are grossly intact. 








- Labs


CBC & Chem 7: 


                                 05/22/20 08:50





                                 05/20/20 09:25


Labs: 


                      Microbiology - Last 24 Hours (Table)











 05/20/20 09:25 Blood Culture - Preliminary





 Blood    No Growth after 24 hours














Assessment and Plan


Plan: 





1.  Multiple cat bites and scratches to the right hand and forearm with 

surrounding cellulitis with sutures in place.  Patient received her first dose 

of rabies, tetanus status has been updated.  Patient failed outpatient 

treatment.  Consults with orthopedic surgeon and Dr. Patrick ruiz.  Patient

is due for second dose of rabies vaccine tomorrow which will be ordered.  Case 

manager has arranged outpatient vaccines on days 7 and 14.





2.  Borderline hypertension, presented with hypertension.  Patient will be 

started on lisinopril 5 mg daily.





3.  Borderline diabetes mellitus type 2.  Diet will be changed to consistent 

carb.





4.  Hyperlipidemia, diet controlled.





5.  COPD, stable without exacerbation.





6.  History of uterine cancer status post hysterectomy.





7.  History of fallopian tube tumor status post resection.





8.  History of shingles, stable.





9.  Remote history of tobacco use and dependence.





10.  DVT prophylaxis.  Heparin subcu.





11.  GI prophylaxis.  Pepcid.





11.  COVID-19 infection not present.











Discharge plan:  home on Saturday.





Impression and plan of care have been directed as dictated by the signing 

physician.  Cyndi Red nurse practitioner acting as scribe for signing 

physician.

## 2020-05-22 NOTE — PN
PROGRESS NOTE



DATE OF SERVICE:

05/22/2020



REASON FOR FOLLOWUP:

Left hand cat bite cellulitis.



INTERVAL HISTORY:

The patient is currently afebrile, patient is breathing comfortably.  Overall

discomfort to the left hand area has decreased.  Patient denies having any chest pain

or shortness of breath, no cough, no abdominal pain, no diarrhea.



PHYSICAL EXAMINATION:

Blood pressure is 150/55, with a pulse of 94, temperature 97.6, she is 96% on room air.

General description is an elderly female, up in the chair in no distress.

RESPIRATORY SYSTEM: Unlabored breathing, clear to auscultation anteriorly.

HEART:  S1, S2.  Regular rate and rhythm.

ABDOMEN:  Soft, no tenderness.

Left hand overall swelling and redness slightly decreased.



LABS:

White count normalized.  Blood culture has been negative.



DIAGNOSTIC IMPRESSION AND PLAN:

Patient with left hand cat bite cellulitis.  Patient to continue with IV Unasyn.  The

patient has no clinical response, dry protective dressing to the area and hopefully

finish therapy with oral antibiotics.  Continue supportive care.





MMODL / IJN: [id number] / Job#: [id number]

## 2020-05-23 VITALS — TEMPERATURE: 98.3 F

## 2020-05-23 RX ADMIN — LISINOPRIL SCH MG: 5 TABLET ORAL at 09:15

## 2020-05-23 RX ADMIN — OXYCODONE AND ACETAMINOPHEN PRN EACH: 5; 325 TABLET ORAL at 22:47

## 2020-05-23 RX ADMIN — MUPIROCIN SCH APPLIC: 20 OINTMENT TOPICAL at 21:12

## 2020-05-23 RX ADMIN — OXYCODONE AND ACETAMINOPHEN PRN EACH: 5; 325 TABLET ORAL at 10:15

## 2020-05-23 RX ADMIN — MUPIROCIN SCH APPLIC: 20 OINTMENT TOPICAL at 12:55

## 2020-05-23 RX ADMIN — AMPICILLIN SODIUM AND SULBACTAM SODIUM SCH MLS/HR: 2; 1 INJECTION, POWDER, FOR SOLUTION INTRAMUSCULAR; INTRAVENOUS at 06:35

## 2020-05-23 RX ADMIN — MUPIROCIN SCH APPLIC: 20 OINTMENT TOPICAL at 16:07

## 2020-05-23 RX ADMIN — AMPICILLIN SODIUM AND SULBACTAM SODIUM SCH MLS/HR: 2; 1 INJECTION, POWDER, FOR SOLUTION INTRAMUSCULAR; INTRAVENOUS at 10:04

## 2020-05-23 RX ADMIN — FAMOTIDINE SCH MG: 20 TABLET, FILM COATED ORAL at 09:15

## 2020-05-23 RX ADMIN — HEPARIN SODIUM SCH UNIT: 5000 INJECTION, SOLUTION INTRAVENOUS; SUBCUTANEOUS at 09:14

## 2020-05-23 RX ADMIN — OXYCODONE AND ACETAMINOPHEN PRN EACH: 5; 325 TABLET ORAL at 14:41

## 2020-05-23 RX ADMIN — OXYCODONE AND ACETAMINOPHEN PRN EACH: 5; 325 TABLET ORAL at 18:33

## 2020-05-23 RX ADMIN — AMPICILLIN SODIUM AND SULBACTAM SODIUM SCH MLS/HR: 2; 1 INJECTION, POWDER, FOR SOLUTION INTRAMUSCULAR; INTRAVENOUS at 21:12

## 2020-05-23 RX ADMIN — AMPICILLIN SODIUM AND SULBACTAM SODIUM SCH MLS/HR: 2; 1 INJECTION, POWDER, FOR SOLUTION INTRAMUSCULAR; INTRAVENOUS at 15:57

## 2020-05-23 RX ADMIN — LORATADINE SCH MG: 10 TABLET ORAL at 09:15

## 2020-05-23 RX ADMIN — HEPARIN SODIUM SCH UNIT: 5000 INJECTION, SOLUTION INTRAVENOUS; SUBCUTANEOUS at 21:12

## 2020-05-23 RX ADMIN — OXYCODONE AND ACETAMINOPHEN PRN EACH: 5; 325 TABLET ORAL at 02:22

## 2020-05-23 NOTE — PN
PROGRESS NOTE



DATE OF SERVICE:

05/23/2020



REASON FOR FOLLOW UP:

Left hand cat bite, cellulitis and abscess.



INTERVAL HISTORY:

The patient is currently afebrile.  The patient is breathing comfortably, feeling

better.  Pain and discomfort to the left hand slightly decreased.  No chest pain,

shortness of breath or cough. No abdominal pain or diarrhea.



PHYSICAL EXAMINATION:

Blood pressure 139/78 with a pulse of 83, temperature 98.4.  She is 93% on room air.

General description is an elderly female up in the bed in no distress. Respiratory

system: Unlabored breathing, clear to auscultation anteriorly.  Heart S1, S2.  Regular

rate and rhythm.  Abdomen soft, no tenderness.  Left hand overall swelling and redness

has improved.



DIAGNOSTIC IMPRESSION AND PLAN:

Patient with left hand cat bite cellulitis, on IV Unasyn to continue.  Will re-evaluate

wound tomorrow.  Hopefully will be able to be put on oral antibiotics.  Continue

supportive care.





MMODL / IJN: [id number] / Job#: [id number]

## 2020-05-23 NOTE — P.PN
Subjective


Progress Note Date: 05/23/20


Principal diagnosis: 


Right upper extremity cat bite





5/23/20: Patient is a pleasant 71-year-old female seen at bedside this morning. 

We are following her for cat bite and wounds to the right upper extremity.  She 

was admitted through the emergency department on 05/20/2020.  She had her wounds

irrigated and closed.  She's been on IV antibiotics.  She feels she continues to

improve daily.  She has no new complaints today.  She is denying fever or 

chills.  She has no new numbness or tingling.








Objective





- Vital Signs


Vital signs: 


                                   Vital Signs











Temp  98.2 F   05/23/20 09:09


 


Pulse  83   05/23/20 09:09


 


Resp  18   05/23/20 09:09


 


BP  158/83   05/23/20 09:09


 


Pulse Ox  97   05/23/20 09:09








                                 Intake & Output











 05/22/20 05/23/20 05/23/20





 18:59 06:59 18:59


 


Intake Total 120  


 


Balance 120  


 


Intake:   


 


  Oral 120  


 


Other:   


 


  Voiding Method Toilet Toilet 


 


  # Voids  2 














- Exam


Inspection of the right upper extremity shows bandage in place at the right 

hand.  There is no active bleeding or drainage through the bandage.  Bandage is 

taken down.  There are 7 nylon sutures in place at dorsum of the hand.  There is

no diffuse erythema, swelling, bleeding or drainage.  Neurovascular status is 

intact throughout the hand and digits including motor and sensation.  There are 

healing scratch wounds at the dorsum of the right forearm.  There is no 

progressive erythema, fluid collection, bleeding or drainage.  Neurovascular 

status of the proximal right upper extremity is intact as well.  2+ radial 

pulses present and less than 2 second capillary refill is present.








- Constitutional


General appearance: Present: no acute distress





- Labs


CBC & Chem 7: 


                                 05/22/20 08:50





                                 05/20/20 09:25


Labs: 


                      Microbiology - Last 24 Hours (Table)











 05/20/20 09:25 Blood Culture - Preliminary





 Blood    No Growth after 48 hours














Assessment and Plan


(1) Cat bite of hand


Narrative/Plan: 


She'll continue with wound care and IV antibiotics per infectious disease.  She 

continues to improve.  I do not see any areas that will require immediate 

surgical intervention.  We will continue to monitor her daily and she may be 

discharged when okay with infectious disease.


Current Visit: No   Status: Acute   Priority: Medium   Code(s): S61.459A - OPEN 

BITE OF UNSPECIFIED HAND, INITIAL ENCOUNTER; W55.01XA - BITTEN BY CAT, INITIAL 

ENCOUNTER   SNOMED Code(s): 092246633


   


Time with Patient: Less than 30

## 2020-05-23 NOTE — P.PN
Subjective


Progress Note Date: 05/23/20


Principal diagnosis: 





Acute cat bite and scratch with severe Celexa the right hand, borderline 

hypertension, borderline diabetes, hyperlipidemia and COPD.








This is a 79 year old  female patient of Dr. Young with past medical 

history of shingles, borderline hypertension, hyperlipidemia on diet control, 

COPD, history of uterine cancer status post hysterectomy at age 29, generalized 

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 

history of migraine headaches resolved after menopause, remote history of 

tobacco use and dependence.  The patient states that yesterday morning she 

stepped out of her house and there was a feral cat outside and her own cat in 

the house.  She ended up falling as a slip and fall and the cats attacked each 

other.  She was in the middle of it ended up getting bitten and scratched on the

right arm, forearm and hand.  She initially came into [facility] emergency center yesterday and had stitches placed, tetanus status 

updated, received her first dose of rabies and started on Augmentin for home.  IRAIS wilson was discharged home but she continued to have increasing redness and 

swelling to the right hand and forearm.  Patient denies any loss of 

consciousness with fall, no head injury.  She does complain of some bruising and

generalized aches and pains around her posterior ribs and lower back area.  She 

does have pain with movement of bilateral hips.  Pelvic and right hip x-ray 

showed no acute fracture on initial presentation.  Hand x-ray also showed no 

acute osseous abnormality.  Patient was started on Unasyn and admitted to the 

[facility] floor with consult for Dr. Nguyen and we have added in a consult with Dr. David Haro.





5/21: Patient has been seen by Dr. Nguyen and continued on Unasyn.  Patient also 

seen by orthopedics with no lymph or surgical intervention.  Patient's erythema 

and edema is improved from yesterday.  Dressing is in place.  Rabies, second 

dose, is due tomorrow which will be ordered.  We'll make sure outpatient rabies 

vaccines are ordered prior to her discharge.  Anticipate discharge in the next 

24-48 hours.  Patient is afebrile, heart rate 87, blood pressure 145/53, pulse 

ox 97% on room air.





5/22: Patient received a 3 rabies vaccine.  She continues to show improvement of

redness and swelling to the right hand and forearm.  When Dr. Nguyen and 

orthopedics are following.  Plan is to continue IV antibiotic for another day 

and ask for antibiotic recommendations from Dr. Nguyen with anticipated discharge

home tomorrow.  Patient has been afebrile, heart rate 80, blood pressure 155/80,

pulse ox 94% on room air.  Repeat WBC 9.0





5/23: Patient is doing very well so far had receive her 3 rabies vaccine she'll 

have another one in 7 days, remain on IV antibiotic with Unasyn at this point, 

still have slight drainage from right hand area was seen orthopedic today and 

extend her stay 1 more day tomorrow she is doing well with discharge home on 

Augmentin.





Objective





- Vital Signs


Vital signs: 


                                   Vital Signs











Temp  98.2 F   05/23/20 13:02


 


Pulse  79   05/23/20 13:02


 


Resp  18   05/23/20 13:02


 


BP  151/77   05/23/20 13:02


 


Pulse Ox  93 L  05/23/20 13:02








                                 Intake & Output











 05/22/20 05/23/20 05/23/20





 18:59 06:59 18:59


 


Intake Total 120  480


 


Balance 120  480


 


Intake:   


 


  Oral 120  480


 


Other:   


 


  Voiding Method Toilet Toilet Toilet


 


  # Voids  2 














- Exam








- Exam





Review of Systems


Constitutional: Denies chills, Denies fever, Denies poor appetite, Denies weight

loss


Ears, nose, mouth and throat: Denies dysphagia, Denies nasal congestion, Denies 

nasal discharge, Denies vertigo


Cardiovascular: Denies chest pain, Denies decreased exercise tolerance, Denies 

dyspnea on exertion, Denies edema, Denies leg edema, Denies lightheadedness, 

Denies shortness of breath, Denies syncope


Respiratory: Denies cough, Denies cough with sputum, Denies dyspnea, Denies 

excessive sputum, Denies hemoptysis, Denies home oxygen, Denies respiratory 

infections, Denies sleep apnea


Gastrointestinal: Denies abdominal pain, Denies diarrhea, Denies loss of 

appetite, Denies nausea, Denies vomiting


Genitourinary: Denies dysuria, Denies hematuria, Denies urgency, Denies urinary 

frequency


Menstruation: Reports postmenopausal


Musculoskeletal: Denies frequent falls, Denies gait dysfunction, Denies muscle 

weakness, Denies myalgias


Integumentary: Reports wounds, reports edemaimproving Denies pruritus, Denies 

rash


Neurological: Denies change in mentation, Denies change in speech, Denies gait 

dysfunction, Denies numbness, Denies seizures, Denies weakness


Psychiatric: Denies anxiety, Denies depression


Endocrine: Denies fatigue, Denies weight change








Physical examination


Gen: This is a 79-year-old  female.  Patient is resting on the edge of 

the bed and appears to be comfortable and in no acute distress.


HEENT: Head is atraumatic, normocephalic. Pupils equal, round. Sclerae is 

anicteric. 


NECK: Supple. No JVD. No lymphadenopathy. No thyromegaly. 


LUNGS: Clear to auscultation. No wheezes or rhonchi.  No intercostal 

retractions.


HEART: Regular rate and rhythm. No murmur. 


ABDOMEN: Soft. Bowel sounds are present. No masses.  No tenderness.


BACK: No ecchymosis noted to the back region.


EXTREMITIES: No pedal edema.  No calf tenderness.  Significant wounds to the 

right hand and right forearm with erythema and edema which are both improved 

from yesterday.  Patient has minimal range of motion to the hand and wrist. 

Bruising noted to the left hand but full range of motion


NEUROLOGICAL: Patient is awake, alert and oriented x3. Cranial nerves 2 through 

12 are grossly intact. 











- Labs


CBC & Chem 7: 


                                 05/22/20 08:50





                                 05/20/20 09:25


Labs: 


                      Microbiology - Last 24 Hours (Table)











 05/20/20 09:25 Blood Culture - Preliminary





 Blood    No Growth after 72 hours














Assessment and Plan


Assessment: 





1 multiple cat bite and scratch with severe cellulitis of the right upper 

extremity and wrist and hand area: Post rabies shot, post T dab shot, remain on 

Unasyn IV still seen infectious disease and orthopedic hand specialist.





2 severe cellulitis: Continue IV antibiotics for 24 more hours.





3 borderline diabetes: Continue Accu-Chek with sliding scales coverage.





4 mild COPD: Patient refuses MDI inhaler at this point.





5 history of uterine cancer post hysterectomy stable.





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





7 hypertension: Remain on lisinopril 5 mg a day blood pressure is under control.





8 Covid 19 infection is not present.





Discharge planning: Most likely discharge home tomorrow.

## 2020-05-24 VITALS — RESPIRATION RATE: 18 BRPM | SYSTOLIC BLOOD PRESSURE: 147 MMHG | DIASTOLIC BLOOD PRESSURE: 83 MMHG | HEART RATE: 76 BPM

## 2020-05-24 RX ADMIN — AMPICILLIN SODIUM AND SULBACTAM SODIUM SCH MLS/HR: 2; 1 INJECTION, POWDER, FOR SOLUTION INTRAMUSCULAR; INTRAVENOUS at 03:53

## 2020-05-24 RX ADMIN — FAMOTIDINE SCH MG: 20 TABLET, FILM COATED ORAL at 08:16

## 2020-05-24 RX ADMIN — OXYCODONE AND ACETAMINOPHEN PRN EACH: 5; 325 TABLET ORAL at 12:09

## 2020-05-24 RX ADMIN — MUPIROCIN SCH APPLIC: 20 OINTMENT TOPICAL at 10:11

## 2020-05-24 RX ADMIN — AMPICILLIN SODIUM AND SULBACTAM SODIUM SCH MLS/HR: 2; 1 INJECTION, POWDER, FOR SOLUTION INTRAMUSCULAR; INTRAVENOUS at 10:11

## 2020-05-24 RX ADMIN — HEPARIN SODIUM SCH UNIT: 5000 INJECTION, SOLUTION INTRAVENOUS; SUBCUTANEOUS at 08:16

## 2020-05-24 RX ADMIN — OXYCODONE AND ACETAMINOPHEN PRN EACH: 5; 325 TABLET ORAL at 03:56

## 2020-05-24 RX ADMIN — LISINOPRIL SCH MG: 5 TABLET ORAL at 08:16

## 2020-05-24 RX ADMIN — LORATADINE SCH MG: 10 TABLET ORAL at 08:16

## 2020-05-24 NOTE — PN
PROGRESS NOTE



DATE OF SERVICE:

05/24/2020



REASON FOR FOLLOWUP:

Left hand cat bite cellulitis.



INTERVAL HISTORY:

The patient is currently afebrile.  The patient is breathing comfortably.  Denies

having chest pain.  No shortness of breath or cough.

Left hand overall pain and swelling are improved.  No drainage.



PHYSICAL EXAMINATION:

Blood pressure 147/83 with a pulse of 76, temperature 98.3.  She is 94% on room air.

General description: The patient is an elderly female up in the bed in no distress.

Respiratory system: Unlabored breathing.  Clear to auscultation anteriorly.

Heart S1, S2.  Regular rate and rhythm.

Abdomen soft.  No tenderness.  Left hand overall swelling and redness has improved.



LABS:

Blood culture negative.  No CBC was done today. White count normal as of yesterday.



DIAGNOSTIC IMPRESSION AND PLAN:

Patient with left hand cat bite cellulitis in this patient who seemed to have shown

overall clinical improvement on Unasyn.  She will finish therapy with oral Augmentin.

She is about 10 day course.  She is advised to follow up in the office in about a week.

All questions and concerns were answered. Also advised if any worsening swelling,

redness on oral antibiotics to let us know right away.





MMODL / IJN: [id number] / Job#: [id number]

## 2020-05-24 NOTE — P.DS
Providers


Date of admission: 


05/21/20 11:06





Attending physician: 


Esteban Jasso





Consults: 





                                        





05/20/20 10:52


Consult Physician Routine 


   Consulting Provider: Emmanuel Nguyen


   Consult Reason/Comments: cellulitis


   Do you want consulting provider notified?: Yes





05/20/20 11:12


Consult Physician Routine 


   Consulting Provider: Victoriano Haro


   Consult Reason/Comments: eval hand injury--cat bite


   Do you want consulting provider notified?: Yes











Primary care physician: 


Percy Young





Hospital Course: 








Acute cat bite and scratch with severe Celexa the right hand, borderline 

hypertension, borderline diabetes, hyperlipidemia and COPD.








This is a 79 year old  female patient of Dr. Young with past medical 

history of shingles, borderline hypertension, hyperlipidemia on diet control, 

COPD, history of uterine cancer status post hysterectomy at age 29, generalized 

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 

history of migraine headaches resolved after menopause, remote history of t

obacco use and dependence.  The patient states that yesterday morning she 

stepped out of her house and there was a feral cat outside and her own cat in 

the house.  She ended up falling as a slip and fall and the cats attacked each 

other.  She was in the middle of it ended up getting bitten and scratched on the

right arm, forearm and hand.  She initially came into [facility] emergency center yesterday and had stitches placed, tetanus status 

updated, received her first dose of rabies and started on Augmentin for home.  

Patient was discharged home but she continued to have increasing redness and 

swelling to the right hand and forearm.  Patient denies any loss of 

consciousness with fall, no head injury.  She does complain of some bruising and

generalized aches and pains around her posterior ribs and lower back area.  She 

does have pain with movement of bilateral hips.  Pelvic and right hip x-ray 

showed no acute fracture on initial presentation.  Hand x-ray also showed no a

cute osseous abnormality.  Patient was started on Unasyn and admitted to the 

[facility] floor with consult for Dr. Nguyen and we have added in a consult with Dr. David Haro.





5/21: Patient has been seen by Dr. Nguyen and continued on Unasyn.  Patient also 

seen by orthopedics with no lymph or surgical intervention.  Patient's erythema 

and edema is improved from yesterday.  Dressing is in place.  Rabies, second 

dose, is due tomorrow which will be ordered.  We'll make sure outpatient rabies 

vaccines are ordered prior to her discharge.  Anticipate discharge in the next 

24-48 hours.  Patient is afebrile, heart rate 87, blood pressure 145/53, pulse 

ox 97% on room air.





5/22: Patient received a 3 rabies vaccine.  She continues to show improvement of

redness and swelling to the right hand and forearm.  When Dr. Nguyen and 

orthopedics are following.  Plan is to continue IV antibiotic for another day 

and ask for antibiotic recommendations from Dr. Nguyen with anticipated discharge

home tomorrow.  Patient has been afebrile, heart rate 80, blood pressure 155/80,

pulse ox 94% on room air.  Repeat WBC 9.0





5/23: Patient is doing very well so far had receive her 3 rabies vaccine she'll 

have another one in 7 days, remain on IV antibiotic with Unasyn at this point, 

still have slight drainage from right hand area was seen orthopedic today and 

extend her stay 1 more day tomorrow she is doing well with discharge home on 

Augmentin.











 Objective 





- Vital Signs


Vital signs: 


                                   Vital Signs











Temp  98.2 F   05/23/20 13:02


 


Pulse  79   05/23/20 13:02


 


Resp  18   05/23/20 13:02


 


BP  151/77   05/23/20 13:02


 


Pulse Ox  93 L  05/23/20 13:02








                                 Intake & Output











 05/22/20 05/23/20 05/23/20





 18:59 06:59 18:59


 


Intake Total 120  480


 


Balance 120  480


 


Intake:   


 


  Oral 120  480


 


Other:   


 


  Voiding Method Toilet Toilet Toilet


 


  # Voids  2 














- Exam








- Exam





Review of Systems


Constitutional: Denies chills, Denies fever, Denies poor appetite, Denies weight

loss


Ears, nose, mouth and throat: Denies dysphagia, Denies nasal congestion, Denies 

nasal discharge, Denies vertigo


Cardiovascular: Denies chest pain, Denies decreased exercise tolerance, Denies 

dyspnea on exertion, Denies edema, Denies leg edema, Denies lightheadedness, 

Denies shortness of breath, Denies syncope


Respiratory: Denies cough, Denies cough with sputum, Denies dyspnea, Denies 

excessive sputum, Denies hemoptysis, Denies home oxygen, Denies respiratory 

infections, Denies sleep apnea


Gastrointestinal: Denies abdominal pain, Denies diarrhea, Denies loss of 

appetite, Denies nausea, Denies vomiting


Genitourinary: Denies dysuria, Denies hematuria, Denies urgency, Denies urinary 

frequency


Menstruation: Reports postmenopausal


Musculoskeletal: Denies frequent falls, Denies gait dysfunction, Denies muscle 

weakness, Denies myalgias


Integumentary: Reports wounds, reports edemaimproving Denies pruritus, Denies 

rash


Neurological: Denies change in mentation, Denies change in speech, Denies gait 

dysfunction, Denies numbness, Denies seizures, Denies weakness


Psychiatric: Denies anxiety, Denies depression


Endocrine: Denies fatigue, Denies weight change








Physical examination


Gen: This is a 79-year-old  female.  Patient is resting on the edge of 

the bed and appears to be comfortable and in no acute distress.


HEENT: Head is atraumatic, normocephalic. Pupils equal, round. Sclerae is 

anicteric. 


NECK: Supple. No JVD. No lymphadenopathy. No thyromegaly. 


LUNGS: Clear to auscultation. No wheezes or rhonchi.  No intercostal ret

ractions.


HEART: Regular rate and rhythm. No murmur. 


ABDOMEN: Soft. Bowel sounds are present. No masses.  No tenderness.


BACK: No ecchymosis noted to the back region.


EXTREMITIES: No pedal edema.  No calf tenderness.  Significant wounds to the 

right hand and right forearm with erythema and edema which are both improved 

from yesterday.  Patient has minimal range of motion to the hand and wrist. Br

uising noted to the left hand but full range of motion


NEUROLOGICAL: Patient is awake, alert and oriented x3. Cranial nerves 2 through 

12 are grossly intact. 











 Assessment and Plan 


Assessment: 





1 multiple cat bite and scratch with severe cellulitis of the right upper 

extremity and wrist and hand area: Post rabies shot, post T dab shot, remain on 

Unasyn IV still seen infectious disease and orthopedic hand specialist.





2 severe cellulitis: Continue IV antibiotics for 24 more hours.





3 borderline diabetes: Continue Accu-Chek with sliding scales coverage.





4 mild COPD: Patient refuses MDI inhaler at this point.





5 history of uterine cancer post hysterectomy stable.





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





7 hypertension: Remain on lisinopril 5 mg a day blood pressure is under control.





8 Covid 19 infection is not present.





05/24/2020: Patient is doing much better her hand swelling improved 

significantly no more drainage infectious disease and orthopedics are agreeable 

to it patient goes home on oral Augmentin and topical care.


Patient still have mild edema Will add Dyazide daily for total of 1 week.


Also patient continued to have mild wheezes most likely from mild water 

retention and history of COPD which she is agreeable to stay on Ventolin HFA 2 

puffs up to 4 times a day as needed.





Patient is very stable to be discharged home today to follow-up with her primary

care and will have home care as well.


Patient Condition at Discharge: Fair





Plan - Discharge Summary


Discharge Rx Participant: No


New Discharge Prescriptions: 


New


   Mupirocin 2% Oint [Bactroban 2% Oint] 1 applic TOPICAL TID #60 gram


   Famotidine [Pepcid] 20 mg PO DAILY #30 tab


   oxyCODONE-APAP 5-325MG [Percocet 5-325 mg] 1 each PO Q4HR PRN #12 tab


     PRN Reason: Severe Pain


   Acetaminophen Tab [Tylenol] 650 mg PO Q6HR PRN  tab


     PRN Reason: Mild Pain Or Fever > 100.5


   Lisinopril [Zestril] 5 mg PO DAILY #30 tab


   Triamterene-Hctz 37.5-25Mg [Dyazide 37.5-25 Capsule] 1 cap PO DAILY #30 

capsule


   Albuterol Sulfate [Ventolin HFA] 2 puff INHALATION Q6H PRN #1 inhaler


     PRN Reason: Shortness Of Breath





Continue


   Amoxicillin/Potassium Clav [Augmentin 875-125 Tablet] 1 tab PO Q12HR #20 tab


   Cetirizine HCl 10 mg PO DAILY


   Aspirin EC [Ecotrin] 325 mg PO DAILY PRN


     PRN Reason: Pain


Discharge Medication List





Amoxicillin/Potassium Clav [Augmentin 875-125 Tablet] 1 tab PO Q12HR #20 tab 

05/19/20 [Rx]


Aspirin EC [Ecotrin] 325 mg PO DAILY PRN 05/20/20 [History]


Cetirizine HCl 10 mg PO DAILY 05/20/20 [History]


Acetaminophen Tab [Tylenol] 650 mg PO Q6HR PRN  tab 05/24/20 [Rx]


Albuterol Sulfate [Ventolin HFA] 2 puff INHALATION Q6H PRN #1 inhaler 05/24/20 

[Rx]


Famotidine [Pepcid] 20 mg PO DAILY #30 tab 05/24/20 [Rx]


Lisinopril [Zestril] 5 mg PO DAILY #30 tab 05/24/20 [Rx]


Mupirocin 2% Oint [Bactroban 2% Oint] 1 applic TOPICAL TID #60 gram 05/24/20 

[Rx]


Triamterene-Hctz 37.5-25Mg [Dyazide 37.5-25 Capsule] 1 cap PO DAILY #30 capsule 

05/24/20 [Rx]


oxyCODONE-APAP 5-325MG [Percocet 5-325 mg] 1 each PO Q4HR PRN #12 tab 05/24/20 

[Rx]








Follow up Appointment(s)/Referral(s): 


[facility], [NON-STAFF] - As Needed


Percy Young MD [Primary Care Provider] - 1-2 days


Zachery Haider MD [STAFF PHYSICIAN] - 1 Week


(Patient may follow-up with Dr. Haider at [facility] in 

1 week following discharge.


)


Activity/Diet/Wound Care/Special Instructions: 


pt to go to [facility] center in the hospital for her rabies vaccine on 

5/26/20 at 9am, and 6/1/20 at for her last infusion they will tell you at time 

at your appt on the 26th.


Discharge Disposition: HOME WITH HOME HEALTH SERVICES

## 2020-06-03 ENCOUNTER — HOSPITAL ENCOUNTER (INPATIENT)
Dept: HOSPITAL 47 - EC | Age: 80
LOS: 3 days | Discharge: HOME HEALTH | DRG: 683 | End: 2020-06-06
Attending: INTERNAL MEDICINE | Admitting: INTERNAL MEDICINE
Payer: MEDICARE

## 2020-06-03 DIAGNOSIS — Z82.3: ICD-10-CM

## 2020-06-03 DIAGNOSIS — T36.0X5A: ICD-10-CM
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
## 2020-06-03 DIAGNOSIS — Z90.710: ICD-10-CM

## 2020-06-03 DIAGNOSIS — J44.9: ICD-10-CM

## 2020-06-03 DIAGNOSIS — N18.9: ICD-10-CM

## 2020-06-03 DIAGNOSIS — I12.9: ICD-10-CM

## 2020-06-03 DIAGNOSIS — Z85.42: ICD-10-CM

## 2020-06-03 DIAGNOSIS — R74.8: ICD-10-CM

## 2020-06-03 DIAGNOSIS — E86.0: ICD-10-CM

## 2020-06-03 DIAGNOSIS — E78.5: ICD-10-CM

## 2020-06-03 DIAGNOSIS — L03.113: ICD-10-CM

## 2020-06-03 DIAGNOSIS — K74.60: ICD-10-CM

## 2020-06-03 DIAGNOSIS — R73.03: ICD-10-CM

## 2020-06-03 DIAGNOSIS — Z90.49: ICD-10-CM

## 2020-06-03 DIAGNOSIS — L27.0: ICD-10-CM
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
## 2020-06-03 DIAGNOSIS — Z79.82: ICD-10-CM

## 2020-06-03 DIAGNOSIS — Z11.59: ICD-10-CM

## 2020-06-03 DIAGNOSIS — Z53.20: ICD-10-CM

## 2020-06-03 DIAGNOSIS — Z87.891: ICD-10-CM

## 2020-06-03 DIAGNOSIS — Z79.2: ICD-10-CM

## 2020-06-03 DIAGNOSIS — Z87.442: ICD-10-CM

## 2020-06-03 DIAGNOSIS — Z79.899: ICD-10-CM

## 2020-06-03 DIAGNOSIS — S41.151D: ICD-10-CM

## 2020-06-03 DIAGNOSIS — N17.0: Primary | ICD-10-CM

## 2020-06-03 LAB
ALBUMIN SERPL-MCNC: 4.5 G/DL (ref 3.5–5)
ALP SERPL-CCNC: 123 U/L (ref 38–126)
ALT SERPL-CCNC: 16 U/L (ref 4–34)
ANION GAP SERPL CALC-SCNC: 10 MMOL/L
APTT BLD: 23.8 SEC (ref 22–30)
AST SERPL-CCNC: 43 U/L (ref 14–36)
BASOPHILS # BLD AUTO: 0.1 K/UL (ref 0–0.2)
BASOPHILS NFR BLD AUTO: 1 %
BUN SERPL-SCNC: 49 MG/DL (ref 7–17)
CALCIUM SPEC-MCNC: 9.7 MG/DL (ref 8.4–10.2)
CHLORIDE SERPL-SCNC: 104 MMOL/L (ref 98–107)
CO2 SERPL-SCNC: 20 MMOL/L (ref 22–30)
EOSINOPHIL # BLD AUTO: 0.3 K/UL (ref 0–0.7)
EOSINOPHIL NFR BLD AUTO: 3 %
ERYTHROCYTE [DISTWIDTH] IN BLOOD BY AUTOMATED COUNT: 4.64 M/UL (ref 3.8–5.4)
ERYTHROCYTE [DISTWIDTH] IN BLOOD: 13.5 % (ref 11.5–15.5)
GLUCOSE SERPL-MCNC: 124 MG/DL (ref 74–99)
HCT VFR BLD AUTO: 44.3 % (ref 34–46)
HGB BLD-MCNC: 14.3 GM/DL (ref 11.4–16)
INR PPP: 1 (ref ?–1.2)
LYMPHOCYTES # SPEC AUTO: 2.7 K/UL (ref 1–4.8)
LYMPHOCYTES NFR SPEC AUTO: 30 %
MCH RBC QN AUTO: 30.8 PG (ref 25–35)
MCHC RBC AUTO-ENTMCNC: 32.3 G/DL (ref 31–37)
MCV RBC AUTO: 95.5 FL (ref 80–100)
MONOCYTES # BLD AUTO: 0.5 K/UL (ref 0–1)
MONOCYTES NFR BLD AUTO: 6 %
NEUTROPHILS # BLD AUTO: 5.2 K/UL (ref 1.3–7.7)
NEUTROPHILS NFR BLD AUTO: 57 %
PLATELET # BLD AUTO: 297 K/UL (ref 150–450)
POTASSIUM SERPL-SCNC: 4.7 MMOL/L (ref 3.5–5.1)
PROT SERPL-MCNC: 9.1 G/DL (ref 6.3–8.2)
PT BLD: 10.3 SEC (ref 9–12)
SODIUM SERPL-SCNC: 134 MMOL/L (ref 137–145)
WBC # BLD AUTO: 9.1 K/UL (ref 3.8–10.6)

## 2020-06-03 PROCEDURE — 80048 BASIC METABOLIC PNL TOTAL CA: CPT

## 2020-06-03 PROCEDURE — 82550 ASSAY OF CK (CPK): CPT

## 2020-06-03 PROCEDURE — 80053 COMPREHEN METABOLIC PANEL: CPT

## 2020-06-03 PROCEDURE — 36415 COLL VENOUS BLD VENIPUNCTURE: CPT

## 2020-06-03 PROCEDURE — 81003 URINALYSIS AUTO W/O SCOPE: CPT

## 2020-06-03 PROCEDURE — 96360 HYDRATION IV INFUSION INIT: CPT

## 2020-06-03 PROCEDURE — 85610 PROTHROMBIN TIME: CPT

## 2020-06-03 PROCEDURE — 99284 EMERGENCY DEPT VISIT MOD MDM: CPT

## 2020-06-03 PROCEDURE — 76770 US EXAM ABDO BACK WALL COMP: CPT

## 2020-06-03 PROCEDURE — 74176 CT ABD & PELVIS W/O CONTRAST: CPT

## 2020-06-03 PROCEDURE — 83605 ASSAY OF LACTIC ACID: CPT

## 2020-06-03 PROCEDURE — 85025 COMPLETE CBC W/AUTO DIFF WBC: CPT

## 2020-06-03 PROCEDURE — 83690 ASSAY OF LIPASE: CPT

## 2020-06-03 PROCEDURE — 85730 THROMBOPLASTIN TIME PARTIAL: CPT

## 2020-06-03 NOTE — ED
General Adult HPI





- General


Chief complaint: Recheck/Abnormal Lab/Rx


Stated complaint: kidney failure


Time Seen by Provider: 20 16:50


Source: patient


Mode of arrival: wheelchair


Limitations: no limitations





- History of Present Illness


Initial comments: 





The patient is a 79-year-old female who presents to the emergency department 

from Dr. Grant's office.  Today was her follow-up visit for a hospitalization 

that she had 3 weeks ago.  The patient was attacked by a feral cat and sustained

a laceration to her right hand.  She then spent several days hospitalized with 

IV antibiotics.  Was discharged home on Augmentin.  States that she took 2 doses

of the medication however Brugada rash and had to stop it.  Reports that her 

cellulitis has improved.  She has felt mildly weak with lack of appetite.  Today

at her doctor's visit Dr. Grant did perform lab studies and found the patient 

had acute kidney injury.  She did direct her into the emergency department for 

evaluation.  Patient received rabies injections however denies any other 

medication changes since her last admission.  Admits to decrease frequency of 

urination.  Denies dysuria or hematuria.  Also reports to bilateral flank pain 

since the attack.  Denies diarrhea, constipation, melenic stools or 

hematochezia.  No lower extremity edema.  There are no alleviating, Perceptin or

modifying factors





- Related Data


                                Home Medications











 Medication  Instructions  Recorded  Confirmed


 


Aspirin EC [Ecotrin] 325 mg PO DAILY PRN 20


 


Cetirizine HCl 10 mg PO DAILY 20








                                  Previous Rx's











 Medication  Instructions  Recorded


 


Acetaminophen Tab [Tylenol] 650 mg PO Q6HR PRN  tab 20


 


Albuterol Sulfate [Ventolin HFA] 2 puff INHALATION Q6H PRN #1 20





 inhaler 


 


Famotidine [Pepcid] 20 mg PO DAILY #30 tab 20


 


Mupirocin 2% Oint [Bactroban 2% 1 applic TOPICAL TID #60 gram 20





Oint]  


 


oxyCODONE-APAP 5-325MG [Percocet 1 each PO Q4HR PRN #12 tab 20





5-325 mg]  


 


amLODIPine [Norvasc] 5 mg PO DAILY #30 tab 20











                                    Allergies











Allergy/AdvReac Type Severity Reaction Status Date / Time


 


amoxicillin [From Augmentin] Allergy  Rash/Hives Verified 20 08:15


 


clavulanic acid Allergy  Rash/Hives Verified 20 08:15





[From Augmentin]     


 


codeine Allergy  Confusion Verified 20 16:49














Review of Systems


ROS Statement: 


Those systems with pertinent positive or pertinent negative responses have been 

documented in the HPI.





ROS Other: All systems not noted in ROS Statement are negative.





Past Medical History


Past Medical History: Cancer, COPD, Hyperlipidemia, Hypertension, Osteoarthritis

(OA), Skin Disorder


Additional Past Medical History / Comment(s): kidney stones


History of Any Multi-Drug Resistant Organisms: None Reported


Past Surgical History: Appendectomy, Hysterectomy


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

by Dr. Lynn in at [facility] in [place], kidney stones


Past Anesthesia/Blood Transfusion Reactions: No Reported Reaction


Past Psychological History: No Psychological Hx Reported


Smoking Status: Former smoker


Past Alcohol Use History: Rare


Past Drug Use History: None Reported





- Past Family History


  ** Mother


Family Medical History: Cancer


Additional Family Medical History / Comment(s): Mother  at age 85 from a 

brain tumor.





  ** Father


Family Medical History: CVA/TIA


Additional Family Medical History / Comment(s): Father  at age 75 from a 

CVA.





  ** Sister(s)


Additional Family Medical History / Comment(s): Patient has 1 sister but she has

no contact with her as her sister stole her identity.





  ** Brother(s)


Additional Family Medical History / Comment(s): Patient does not have any 

brothers.  Patient has 4 children, one  at age 7 weeks, other children with 

no major medical problems.  Patient is also adopted 1 child.





General Exam


Limitations: no limitations


General appearance: alert, in no apparent distress


Head exam: Present: atraumatic, normocephalic, normal inspection


Eye exam: Present: normal appearance, PERRL, EOMI.  Absent: scleral icterus, 

conjunctival injection, periorbital swelling


ENT exam: Present: normal exam, mucous membranes moist


Neck exam: Present: normal inspection.  Absent: tenderness, meningismus, 

lymphadenopathy


Respiratory exam: Present: normal lung sounds bilaterally.  Absent: respiratory 

distress, wheezes, rales, rhonchi, stridor


Cardiovascular Exam: Present: regular rate, normal rhythm, normal heart sounds. 

Absent: systolic murmur, diastolic murmur, rubs, gallop, clicks


GI/Abdominal exam: Present: soft, normal bowel sounds.  Absent: distended, tend

erness, guarding, rebound, rigid


Extremities exam: Present: normal inspection, full ROM, normal capillary refill.

 Absent: tenderness, pedal edema, joint swelling, calf tenderness


Back exam: Present: normal inspection


Neurological exam: Present: alert, oriented X3, CN II-XII intact


Psychiatric exam: Present: normal affect, normal mood


Skin exam: Present: warm, dry, intact, normal color, abrasion (right dorsal hand

- healing appropriately without signs of active infection).  Absent: rash





Course


                                   Vital Signs











  20





  16:47 19:48


 


Temperature 98.2 F 98 F


 


Pulse Rate 101 H 79


 


Respiratory 18 18





Rate  


 


Blood Pressure 133/82 130/78


 


O2 Sat by Pulse 96 97





Oximetry  














Medical Decision Making





- Medical Decision Making





Upon arrival the patient was placed into room 3.  A thorough history and 

physical exam is performed.  I did repeat laboratory studies.  Also recommended 

a CT of the patient's abdomen and pelvis without contrast.  Laboratory studies 

are remarkable for a creatinine of 3.1.  The patient was given a liter bolus of 

normal saline followed by 125 mL per hour.  CT of the patient's abdomen and 

pelvis demonstrates no renal stones or hydronephrosis.  Hepatic cirrhosis 

without splenomegaly.  Suspected mild subacute compression fracture through the 

superior L1 vertebrae.  Patient has no lower extremity weakness.  I did order a 

bladder scan as the patient has been unable to provide a urine sample.  I did 

discuss the case with Dr. Jasso who accepted admission.  He is requesting 

ultrasound of the kidneys.  I will place Dr. Gerard on consult.  Patient remained

in stable condition and was transported to floor.  I will hold all the patient's

medications which are nephrotoxic





- Lab Data


Result diagrams: 


                                 20 06:01





                                 20 09:24


                                   Lab Results











  20 Range/Units





  17:01 17:01 17:01 


 


WBC  9.1    (3.8-10.6)  k/uL


 


RBC  4.64    (3.80-5.40)  m/uL


 


Hgb  14.3    (11.4-16.0)  gm/dL


 


Hct  44.3    (34.0-46.0)  %


 


MCV  95.5    (80.0-100.0)  fL


 


MCH  30.8    (25.0-35.0)  pg


 


MCHC  32.3    (31.0-37.0)  g/dL


 


RDW  13.5    (11.5-15.5)  %


 


Plt Count  297    (150-450)  k/uL


 


Neutrophils %  57    %


 


Lymphocytes %  30    %


 


Monocytes %  6    %


 


Eosinophils %  3    %


 


Basophils %  1    %


 


Neutrophils #  5.2    (1.3-7.7)  k/uL


 


Lymphocytes #  2.7    (1.0-4.8)  k/uL


 


Monocytes #  0.5    (0-1.0)  k/uL


 


Eosinophils #  0.3    (0-0.7)  k/uL


 


Basophils #  0.1    (0-0.2)  k/uL


 


PT   10.3   (9.0-12.0)  sec


 


INR   1.0   (<1.2)  


 


APTT   23.8   (22.0-30.0)  sec


 


Sodium    134 L  (137-145)  mmol/L


 


Potassium    4.7  (3.5-5.1)  mmol/L


 


Chloride    104  ()  mmol/L


 


Carbon Dioxide    20 L  (22-30)  mmol/L


 


Anion Gap    10  mmol/L


 


BUN    49 H  (7-17)  mg/dL


 


Creatinine    3.11 H  (0.52-1.04)  mg/dL


 


Est GFR (CKD-EPI)AfAm    16  (>60 ml/min/1.73 sqM)  


 


Est GFR (CKD-EPI)NonAf    14  (>60 ml/min/1.73 sqM)  


 


Glucose    124 H  (74-99)  mg/dL


 


Plasma Lactic Acid Nando     (0.7-2.0)  mmol/L


 


Calcium    9.7  (8.4-10.2)  mg/dL


 


Total Bilirubin    0.5  (0.2-1.3)  mg/dL


 


AST    43 H  (14-36)  U/L


 


ALT    16  (4-34)  U/L


 


Alkaline Phosphatase    123  ()  U/L


 


Creatine Kinase     ()  U/L


 


Total Protein    9.1 H  (6.3-8.2)  g/dL


 


Albumin    4.5  (3.5-5.0)  g/dL


 


Lipase    378 H  ()  U/L














  20 Range/Units





  17:01 17:01 


 


WBC    (3.8-10.6)  k/uL


 


RBC    (3.80-5.40)  m/uL


 


Hgb    (11.4-16.0)  gm/dL


 


Hct    (34.0-46.0)  %


 


MCV    (80.0-100.0)  fL


 


MCH    (25.0-35.0)  pg


 


MCHC    (31.0-37.0)  g/dL


 


RDW    (11.5-15.5)  %


 


Plt Count    (150-450)  k/uL


 


Neutrophils %    %


 


Lymphocytes %    %


 


Monocytes %    %


 


Eosinophils %    %


 


Basophils %    %


 


Neutrophils #    (1.3-7.7)  k/uL


 


Lymphocytes #    (1.0-4.8)  k/uL


 


Monocytes #    (0-1.0)  k/uL


 


Eosinophils #    (0-0.7)  k/uL


 


Basophils #    (0-0.2)  k/uL


 


PT    (9.0-12.0)  sec


 


INR    (<1.2)  


 


APTT    (22.0-30.0)  sec


 


Sodium    (137-145)  mmol/L


 


Potassium    (3.5-5.1)  mmol/L


 


Chloride    ()  mmol/L


 


Carbon Dioxide    (22-30)  mmol/L


 


Anion Gap    mmol/L


 


BUN    (7-17)  mg/dL


 


Creatinine    (0.52-1.04)  mg/dL


 


Est GFR (CKD-EPI)AfAm    (>60 ml/min/1.73 sqM)  


 


Est GFR (CKD-EPI)NonAf    (>60 ml/min/1.73 sqM)  


 


Glucose    (74-99)  mg/dL


 


Plasma Lactic Acid Nando  1.8   (0.7-2.0)  mmol/L


 


Calcium    (8.4-10.2)  mg/dL


 


Total Bilirubin    (0.2-1.3)  mg/dL


 


AST    (14-36)  U/L


 


ALT    (4-34)  U/L


 


Alkaline Phosphatase    ()  U/L


 


Creatine Kinase   50  ()  U/L


 


Total Protein    (6.3-8.2)  g/dL


 


Albumin    (3.5-5.0)  g/dL


 


Lipase    ()  U/L














Disposition


Clinical Impression: 


 CARLYLE (acute kidney injury)





Disposition: ADMITTED AS IP TO THIS [facility]


Condition: Good


Is patient prescribed a controlled substance at d/c from ED?: No


Decision to Admit Reason: Admit from EC


Decision Date: 20


Decision Time: 18:36

## 2020-06-03 NOTE — CT
EXAMINATION TYPE: CT abdomen pelvis wo con

 

DATE OF EXAM: 6/3/2020

 

HISTORY: Kidney failure, bilateral flank pain

 

CT DLP: 788.5 mGycm.  Automated Exposure Control for Dose Reduction was Utilized.

 

TECHNIQUE:  CT scan of the abdomen and pelvis is performed without oral or IV contrast.

 

COMPARISON: NONE

 

FINDINGS:  Within the limitations of a non-contrast study, the following observations are made.

 

LUNG BASES: No significant abnormality is appreciated.

 

LIVER/GB: Liver is diffusely low dense with lobulated peripheral nodular contour and slightly small i
n size. Findings consistent with underlying cirrhosis. No surrounding ascites.

 

PANCREAS: No significant abnormality is seen.

 

SPLEEN: No significant abnormality is seen.

 

ADRENALS: No significant abnormality is seen.

 

KIDNEYS: Cortical thinning both kidneys. No renal stones noted bilaterally. Simple appearing exophyti
c 3.5 cm thin-walled cyst posteriorly lower pole of the right kidney.

 

BOWEL: No suspicious small or large bowel dilatation. Diverticula in the sigmoid colon without CT joey
dence for acute diverticulitis.

 

GENITAL ORGANS: Uterus surgically absent or markedly atrophic. A few scattered pelvic phleboliths.

 

LYMPH NODES: No greater than 1cm abdominal or pelvic lymph nodes are appreciated.

 

OSSEOUS STRUCTURES: Hemangioma involving the L3 vertebra. Age indeterminate probable subacute mild co
mpression fracture involving the superior L1 endplate as there is some lucency and sclerosis. No post
erior retropulsion. Mild to moderate narrowing and moderate acetabular spurring of both hips.

 

OTHER: No significant additional abnormality is seen.

 

IMPRESSION: 

1. No renal stones or hydronephrosis is seen bilaterally.

2. Hepatic cirrhosis is noted without splenomegaly or surrounding ascites.

3. Suspected mild subacute compression type fracture deformity through the superior L1 vertebra, mynor
elate clinically.

## 2020-06-04 LAB
ALBUMIN SERPL-MCNC: 3.4 G/DL (ref 3.5–5)
ALP SERPL-CCNC: 99 U/L (ref 38–126)
ALT SERPL-CCNC: 12 U/L (ref 4–34)
ANION GAP SERPL CALC-SCNC: 7 MMOL/L
AST SERPL-CCNC: 27 U/L (ref 14–36)
BASOPHILS # BLD AUTO: 0.1 K/UL (ref 0–0.2)
BASOPHILS NFR BLD AUTO: 1 %
BUN SERPL-SCNC: 40 MG/DL (ref 7–17)
CALCIUM SPEC-MCNC: 8.4 MG/DL (ref 8.4–10.2)
CHLORIDE SERPL-SCNC: 111 MMOL/L (ref 98–107)
CO2 SERPL-SCNC: 21 MMOL/L (ref 22–30)
EOSINOPHIL # BLD AUTO: 0.4 K/UL (ref 0–0.7)
EOSINOPHIL NFR BLD AUTO: 7 %
ERYTHROCYTE [DISTWIDTH] IN BLOOD BY AUTOMATED COUNT: 4.08 M/UL (ref 3.8–5.4)
ERYTHROCYTE [DISTWIDTH] IN BLOOD: 13.6 % (ref 11.5–15.5)
GLUCOSE SERPL-MCNC: 88 MG/DL (ref 74–99)
HCT VFR BLD AUTO: 39.6 % (ref 34–46)
HGB BLD-MCNC: 12.6 GM/DL (ref 11.4–16)
LYMPHOCYTES # SPEC AUTO: 1.8 K/UL (ref 1–4.8)
LYMPHOCYTES NFR SPEC AUTO: 30 %
MCH RBC QN AUTO: 31 PG (ref 25–35)
MCHC RBC AUTO-ENTMCNC: 31.9 G/DL (ref 31–37)
MCV RBC AUTO: 97.1 FL (ref 80–100)
MONOCYTES # BLD AUTO: 0.4 K/UL (ref 0–1)
MONOCYTES NFR BLD AUTO: 6 %
NEUTROPHILS # BLD AUTO: 3.2 K/UL (ref 1.3–7.7)
NEUTROPHILS NFR BLD AUTO: 53 %
PH UR: 5 [PH] (ref 5–8)
PLATELET # BLD AUTO: 194 K/UL (ref 150–450)
POTASSIUM SERPL-SCNC: 4.5 MMOL/L (ref 3.5–5.1)
PROT SERPL-MCNC: 7.1 G/DL (ref 6.3–8.2)
SODIUM SERPL-SCNC: 139 MMOL/L (ref 137–145)
SP GR UR: 1.01 (ref 1–1.03)
UROBILINOGEN UR QL STRIP: <2 MG/DL (ref ?–2)
WBC # BLD AUTO: 6.1 K/UL (ref 3.8–10.6)

## 2020-06-04 RX ADMIN — CEFAZOLIN SCH: 330 INJECTION, POWDER, FOR SOLUTION INTRAMUSCULAR; INTRAVENOUS at 01:06

## 2020-06-04 RX ADMIN — CEFAZOLIN SCH MLS/HR: 330 INJECTION, POWDER, FOR SOLUTION INTRAMUSCULAR; INTRAVENOUS at 02:45

## 2020-06-04 RX ADMIN — FAMOTIDINE SCH MG: 20 TABLET, FILM COATED ORAL at 09:02

## 2020-06-04 RX ADMIN — CEFAZOLIN SCH: 330 INJECTION, POWDER, FOR SOLUTION INTRAMUSCULAR; INTRAVENOUS at 15:27

## 2020-06-04 RX ADMIN — LORATADINE SCH MG: 10 TABLET ORAL at 09:02

## 2020-06-04 RX ADMIN — CEFAZOLIN SCH MLS/HR: 330 INJECTION, POWDER, FOR SOLUTION INTRAMUSCULAR; INTRAVENOUS at 22:12

## 2020-06-04 RX ADMIN — ACETAMINOPHEN PRN MG: 325 TABLET, FILM COATED ORAL at 22:16

## 2020-06-04 NOTE — P.PN
Subjective


Progress Note Date: 06/04/20





79-year-old female one of Dr. Young (changed to Dr. Grant as she had not followed

with him in 3 years) patient who was hospitalized on May 20 until May 20 14,024 

right upper extremity acute cat bite and scratch with severe cellulitis.  

Patient was on Unasyn and had rabies series of shots.  Left the hospital doing 

well with creatinine 0.9 at the time.  Patient apparently developed to have 

significant side effect to Augmentin which was sent home on and contacted  

Sayed stop the antibiotic according to her she described significant reaction 

with rash all over with slight itching and irritation.  She was in to see her 

primary care physician today why she wasn't feeling and had severe abnormal 

creatinine with her blood test running over 3.0 patient is making less urine 

output slight difficulty with urination as well.  Patient was referred to demurs

department for acute kidney injury severe dehydration and possible obstructive 

uropathy.  Ended up having CT of the abdomen and pelvis with findings suspected 

for mild subacute compression time of fracture of the L1 vertebrae with no renal

stone or hydronephrosis but had mild hepatic cirrhosis on the finding with a CAT

scan.  Ultrasound of the kidney showed suboptimal study with evidence of chronic

medical renal disease with no gross hydronephrosis bilaterally.  She was started

on hydration will be admitted to the hospital consult nephrology watch her urine

output.  Mild incidental finding of elevated lipase with no sign or symptom of 

pancreatitis.





6/4: Repeat blood work this morning reveals a normal CBC, BUN 14 creatinine 2.3 

to, potassium 4.5, chloride 111, CO2 21.  Liver function tests are normal.  

Patient states she received her last rabies vaccine on Monday.  Patient is 

currently on IV fluids at 125 mL per hour.  Nephrology is on consult.  

Coronavirus PCR not detected.  Patient has been afebrile, heart rate 83, blood 

pressure 139/65 and pulse ox 98% on room air.














Objective





- Vital Signs


Vital signs: 


                                   Vital Signs











Temp  97.3 F L  06/04/20 07:11


 


Pulse  83   06/04/20 07:11


 


Resp  16   06/04/20 07:11


 


BP  139/65   06/04/20 07:11


 


Pulse Ox  98   06/04/20 07:11








                                 Intake & Output











 06/03/20 06/04/20 06/04/20





 18:59 06:59 18:59


 


Intake Total  20 


 


Balance  20 


 


Weight 82.1 kg 82.1 kg 


 


Intake:   


 


  Oral  20 


 


Other:   


 


  Voiding Method  Toilet 


 


  # Voids  3 














- Exam





 Review of Systems 





CONSTITUTIONAL: Well-developed no acute respiratory distress.  Denies fevers, 

denies chills.


EYES: No icterus sclerae, no conjunctivitis.


EARS, NOSE, MOUTH, THROAT, and FACE: No sore throat, lymphadenopathy, carotid 

bruits or deformity.


RESPIRATORY: No SOB cough or wheezes.


CARDIOVASCULAR: No CP, Palpitation, PND, Orthopnea, or angina.


GASTROINTESTINAL: No Abd pain, Nausea or vomiting, no Diarrhea or constipation, 

No GI Bleed, no distention or masses.


GENITOURINARY: Acute kidney injury with kidney failure.


INTEGUMENT/BREAST: Pain and discomfort in the right upper extremity from cat scr

atch encephalitis but did improve.


HEMATOLOGIC/LYMPHATIC: Negative for bleed or purpura.


MUSCULOSKELTAL: Negative for Myalgia or arthralgia.


NEURLOGICAL: No LOC, Sz or syncope, blurred vision dizziness or abnormality..


BEHAVIORAL/PSYCH: Negative.


ENDOCRINE: Negative.








Physical Examination


General Appearance: Alert, cooperative, no distress, appears stated age.  P

atient appears to be in no acute distress.


Neck HEENT: Supple, no lymphadenopathy, no thyroid enlargement, no carotid 

bruits.


Lungs: Clear to auscultation without crackles or wheezes no rhonchi, no de

formity.


Chest Wall: Chest wall normal expansion with deep inspiration no tenderness and 

no deformity was found on exam, no costochondral pain or discomfort.


Heart: Regular rate and rhythm, S1, S2 normal, no murmur, rub or gallop.


Back: Symmetric, no curvature, ROM normal, no CVA tenderness.


Abdomen: Soft, non-tender, bowel sounds active all four quadrants,   


Extremities right upper extremity the cats scratch and bite area has healed with

no drainage still have slight irritation and discomfort with a scar tissue from 

her last hospitalization and suture.


Pulses: 2+ and symmetric.


Skin: Skin color, texture, tugor normal, no rashes or lesions.


Neurologic: Alert oriented x3 cranial nerves II through XII intact, no motor 

deficit, no abnormal balance or gait.








- Labs


CBC & Chem 7: 


                                 06/04/20 06:01





                                 06/04/20 06:01


Labs: 


                  Abnormal Lab Results - Last 24 Hours (Table)











  06/03/20 06/04/20 Range/Units





  17:01 06:01 


 


Sodium  134 L   (137-145)  mmol/L


 


Chloride   111 H  ()  mmol/L


 


Carbon Dioxide  20 L  21 L  (22-30)  mmol/L


 


BUN  49 H  40 H  (7-17)  mg/dL


 


Creatinine  3.11 H  2.32 H  (0.52-1.04)  mg/dL


 


Glucose  124 H   (74-99)  mg/dL


 


AST  43 H   (14-36)  U/L


 


Total Protein  9.1 H   (6.3-8.2)  g/dL


 


Albumin   3.4 L  (3.5-5.0)  g/dL


 


Lipase  378 H   ()  U/L














Assessment and Plan


Plan: 





1 acute kidney failure: With acute kidney injury most likely from ATN no sign of

obstructive uropathy or kidney stone at this point, this could be reaction to 

either antibiotics, dehydration or reaction to the rabies shot.  We'll continue 

hydration consult nephrology recheck BUN/creatinine 24 hours.





2 severe cellulitis and cat scratch and bite of the right upper extremity: 

Patient had finished her course of antibiotic despite the reaction to the 

penicillin toward the end she finished the seventh course.  Patient completed 

rabies vaccine on Monday.





3 COPD: Still on Ventolin HFA as needed refuses steroid inhaler at this point.





4 hypertension: Has been on lisinopril 5 mg a day.





5 borderline diabetes: Continue Accu-Chek with sliding scales coverage.





6 elevated lipase with no sign of pancreatitis repeat lipase and amylase in 24 

hours at this point continue conservative management.





7 history of uterine cancer post hysterectomy.





8 DVT prophylaxis: Patient will be on heparin subcutaneous.





9 GI prophylaxis: Patient be on Pepcid 20 mg daily.





CODE STATUS: Full code.





Discharge plan: Return home.





Impression and plan of care have been directed as dictated by the signing 

physician.  Cyndi Red nurse practitioner acting as scribe for signing 

physician.

## 2020-06-04 NOTE — CONS
CONSULTATION



REASON FOR CONSULT:

Renal failure.



HISTORY OF PRESENT ILLNESS:

The patient is a 79-year-old female who was admitted to the hospital with abnormal labs

as outpatient.  The patient has a history of recent cat bite on the dorsum of her right

hand.  She was hospitalized, maintained on antibiotics and discharged home.  However,

labs done as outpatient showed a serum creatinine above 3, and therefore patient was

admitted.  She denied use of any nonsteroidal anti-inflammatory agents.  The patient

did admit to not eating or drinking much over the last week or so.  She had decreased

urine output. Prior creatinine was 0.9 on 5/20/2020.  Patient was maintained on ACE

inhibitors at home.  Her blood pressure has not been significantly low.  Currently

patient is maintained on IV fluids and creatinine is down to 2.3.  A UA is not

available.



PAST MEDICAL HISTORY:

Recent cat bite, status post antibiotics, history of hypertension, hyperlipidemia,

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



PAST SURGICAL HISTORY:

Appendectomy, hysterectomy.



SOCIAL HISTORY:

The patient is a former smoker.  No history of drug abuse or alcohol abuse.



MEDICATIONS:

Medications prior to admission included Augmentin, cetirizine, Ecotrin, Tylenol,

Pepcid, Zestril, Dyazide, Percocet.



ALLERGIES:

ALLERGIES include CODEINE, which causes confusion.



REVIEW OF SYSTEMS:

As per HPI.  Other systems negative.



PHYSICAL EXAMINATION:

Patient is comfortable, awake, alert, oriented x3, not in any acute distress.  Blood

pressure is 117/68, heart rate 81 per minute. She is afebrile.

EXAMINATION OF THE HEART: S1 and S2.

EXAMINATION OF LUNGS: Bilateral breath sounds are heard.

ABDOMEN:  Soft, non-tender.

Examination of lower extremities shows no evidence of edema.  Healing wound is noted on

her right hand and forearm.

CNS examination is grossly intact.



LABS:

Labs show sodium 139, potassium 4.5, chloride 111. CO2 is 21, BUN 40, creatinine 2.32.



ASSESSMENT:

1. Acute kidney injury, most likely prerenal, currently improved with IV hydration.

    Check urinalysis. Rule out underlying interstitial nephritis.  Previous creatinine

    was 0.9 on 5/20/2020.  Currently patient is nonoliguric with improved urine output.

    Continue to hold off on ACE inhibitors for now.  Ultrasound does not show any

    evidence of hydronephrosis.

2. History of nephrolithiasis.  No stone noted on the current ultrasound. Cyst was

    seen.

3. Recent cat bite, status post antibiotics, currently maintained on Augmentin prior

    to admission.

4. Chronic obstructive pulmonary disease.

5. Hypertension, controlled. Maintained off of lisinopril.



PLAN:

Continue IV fluids.  Continue off of ACE inhibitors.  Repeat labs in a.m.  Check

urinalysis.



Thank you for this consultation.  Will continue to follow the patient with you during

her hospitalization.





MMODL / IJN: [id number] / Job#: [id number]

## 2020-06-05 LAB
ANION GAP SERPL CALC-SCNC: 6 MMOL/L
BUN SERPL-SCNC: 25 MG/DL (ref 7–17)
CALCIUM SPEC-MCNC: 8.4 MG/DL (ref 8.4–10.2)
CHLORIDE SERPL-SCNC: 113 MMOL/L (ref 98–107)
CO2 SERPL-SCNC: 22 MMOL/L (ref 22–30)
GLUCOSE SERPL-MCNC: 86 MG/DL (ref 74–99)
POTASSIUM SERPL-SCNC: 4.5 MMOL/L (ref 3.5–5.1)
SODIUM SERPL-SCNC: 141 MMOL/L (ref 137–145)

## 2020-06-05 RX ADMIN — OXYCODONE AND ACETAMINOPHEN PRN EACH: 5; 325 TABLET ORAL at 10:30

## 2020-06-05 RX ADMIN — LORATADINE SCH MG: 10 TABLET ORAL at 08:34

## 2020-06-05 RX ADMIN — CEFAZOLIN SCH MLS/HR: 330 INJECTION, POWDER, FOR SOLUTION INTRAMUSCULAR; INTRAVENOUS at 10:30

## 2020-06-05 RX ADMIN — CEFAZOLIN SCH MLS/HR: 330 INJECTION, POWDER, FOR SOLUTION INTRAMUSCULAR; INTRAVENOUS at 18:18

## 2020-06-05 RX ADMIN — CEFAZOLIN SCH MLS/HR: 330 INJECTION, POWDER, FOR SOLUTION INTRAMUSCULAR; INTRAVENOUS at 02:35

## 2020-06-05 RX ADMIN — FAMOTIDINE SCH MG: 20 TABLET, FILM COATED ORAL at 08:33

## 2020-06-05 RX ADMIN — OXYCODONE AND ACETAMINOPHEN PRN EACH: 5; 325 TABLET ORAL at 22:14

## 2020-06-05 RX ADMIN — ACETAMINOPHEN PRN MG: 325 TABLET, FILM COATED ORAL at 08:33

## 2020-06-05 RX ADMIN — OXYCODONE AND ACETAMINOPHEN PRN EACH: 5; 325 TABLET ORAL at 18:21

## 2020-06-05 NOTE — PN
PROGRESS NOTE



Patient is seen for followup for acute kidney injury.  She was admitted to the hospital

with a creatinine of 3.1 after recent hospitalization for a cat bite.  Currently

maintained on IV fluids. Renal function has improved significantly and her creatinine

is down to 1.35 mg/dL.  The patient denies any significant complaints.  She feels well

and has had good urine output.



PHYSICAL EXAMINATION:

On examination, blood pressure is 139/75, heart rate 74 per minute. She is afebrile.

EXAMINATION OF THE HEART: S1 and S2.

EXAMINATION OF LUNGS: Bilateral breath sounds are heard.

ABDOMEN:  Soft, non-tender.

Examination of lower extremities shows no significant edema.

CNS exam is grossly intact.



LABS:

Labs show sodium 141, potassium 4.5, chloride 113, BUN 25, creatinine 1.35.



ASSESSMENT:

1. Acute kidney injury, mostly prerenal, currently improved with IV hydration.  I will

    decrease IV fluids to about 60 mL/hour.

2. Cat bite, status post antibiotics, on the dorsum of the right hand.

3. Chronic obstructive pulmonary disease.

4. Hypertension, currently controlled.



PLAN:

Can resume ACE inhibitors down the road if blood pressure is elevated.  Decrease IV

fluids.  The patient can likely be discharged tomorrow.





MMODL / IJN: [id number] / Job#: [id number]

## 2020-06-05 NOTE — P.PN
Subjective


Progress Note Date: 06/05/20





79-year-old female one of Dr. Young (changed to Dr. Grant as she had not followed

with him in 3 years) patient who was hospitalized on May 20 until May 20 14,024 

right upper extremity acute cat bite and scratch with severe cellulitis.  

Patient was on Unasyn and had rabies series of shots.  Left the hospital doing 

well with creatinine 0.9 at the time.  Patient apparently developed to have 

significant side effect to Augmentin which was sent home on and contacted  

Sayed stop the antibiotic according to her she described significant reaction 

with rash all over with slight itching and irritation.  She was in to see her 

primary care physician today why she wasn't feeling and had severe abnormal 

creatinine with her blood test running over 3.0 patient is making less urine 

output slight difficulty with urination as well.  Patient was referred to demurs

department for acute kidney injury severe dehydration and possible obstructive 

uropathy.  Ended up having CT of the abdomen and pelvis with findings suspected 

for mild subacute compression time of fracture of the L1 vertebrae with no renal

stone or hydronephrosis but had mild hepatic cirrhosis on the finding with a CAT

scan.  Ultrasound of the kidney showed suboptimal study with evidence of chronic

medical renal disease with no gross hydronephrosis bilaterally.  She was started

on hydration will be admitted to the hospital consult nephrology watch her urine

output.  Mild incidental finding of elevated lipase with no sign or symptom of 

pancreatitis.





6/4: Repeat blood work this morning reveals a normal CBC, BUN 14 creatinine 2.3 

to, potassium 4.5, chloride 111, CO2 21.  Liver function tests are normal.  

Patient states she received her last rabies vaccine on Monday.  Patient is 

currently on IV fluids at 125 mL per hour.  Nephrology is on consult.  

Coronavirus PCR not detected.  Patient has been afebrile, heart rate 83, blood 

pressure 139/65 and pulse ox 98% on room air.





6/5: Repeat blood work today reveals sodium 141, potassium 4.5, chloride 113, 

CO2 22, BUN 25 creatinine 1.35.  The patient denies any complaints other than 

being uncomfortable with her bed.  She is eating and drinking adequately as well

as has good urine output.  She has been continued on IV fluids.  She was seen by

Dr. Gerard with recommendations to continue holding Ace inhibitor.  Urinalysis 

was clear with nitrate leukoesterase and protein negative.  Anticipate discharge

home tomorrow.  Repeat lab work ordered for the morning.











Objective





- Vital Signs


Vital signs: 


                                   Vital Signs











Temp  97.7 F   06/05/20 07:00


 


Pulse  81   06/05/20 07:00


 


Resp  17   06/05/20 07:00


 


BP  135/80   06/05/20 07:00


 


Pulse Ox  97   06/05/20 07:00








                                 Intake & Output











 06/04/20 06/05/20 06/05/20





 18:59 06:59 18:59


 


Intake Total 1915 200 


 


Output Total 1000 1850 


 


Balance 915 -1650 


 


Intake:   


 


  Intake, IV Titration 1375  





  Amount   


 


    Sodium Chloride 0.9% 1, 1375  





    000 ml @ 125 mls/hr IV .   





    Q8H YANE Rx#:[id number]   


 


  Oral 540 200 


 


Output:   


 


  Urine 1000 1850 


 


Other:   


 


  Voiding Method  Toilet 


 


  # Voids 3 1 














- Exam





 Review of Systems 





CONSTITUTIONAL: Well-developed no acute respiratory distress.  Denies fevers, 

denies chills.


EYES: No icterus sclerae, no conjunctivitis.


EARS, NOSE, MOUTH, THROAT, and FACE: No sore throat, lymphadenopathy, carotid 

bruits or deformity.


RESPIRATORY: No SOB cough or wheezes.


CARDIOVASCULAR: No CP, Palpitation, PND, Orthopnea, or angina.


GASTROINTESTINAL: No Abd pain, Nausea or vomiting, no Diarrhea or constipation, 

No GI Bleed, no distention or masses.


GENITOURINARY: Acute kidney injury with kidney failure.


INTEGUMENT/BREAST: Pain and discomfort in the right upper extremity from cat 

scratch improved.


HEMATOLOGIC/LYMPHATIC: Negative for bleed or purpura.


MUSCULOSKELTAL: Negative for Myalgia or arthralgia.


NEURLOGICAL: No LOC, Sz or syncope, blurred vision dizziness or abnormality.


BEHAVIORAL/PSYCH: Negative.


ENDOCRINE: Negative.








Physical Examination


General Appearance: Alert, cooperative, no distress, appears stated age.  

Patient appears to be in no acute distress.


Neck HEENT: Supple, no lymphadenopathy, no thyroid enlargement, no carotid 

bruits.


Lungs: Clear to auscultation without crackles or wheezes no rhonchi, no 

deformity.


Chest Wall: Chest wall normal expansion with deep inspiration no tenderness and 

no deformity was found on exam, no costochondral pain or discomfort.


Heart: Regular rate and rhythm, S1, S2 normal, no murmur, rub or gallop.


Back: Symmetric, no curvature, ROM normal, no CVA tenderness.


Abdomen: Soft, non-tender, bowel sounds active all four quadrants,   


Extremities right upper extremity the cats scratch and bite area has healed with

no drainage still have slight irritation and discomfort with a scar tissue.


Pulses: 2+ and symmetric.


Skin: Skin color, texture, tugor normal, no rashes or lesions.


Neurologic: Alert oriented x3 cranial nerves II through XII intact, no motor 

deficit, no abnormal balance or gait.








- Labs


CBC & Chem 7: 


                                 06/04/20 06:01





                                 06/05/20 07:12


Labs: 


                  Abnormal Lab Results - Last 24 Hours (Table)











  06/05/20 Range/Units





  07:12 


 


Chloride  113 H  ()  mmol/L


 


BUN  25 H  (7-17)  mg/dL


 


Creatinine  1.35 H  (0.52-1.04)  mg/dL


 


Lipase  446 H  ()  U/L














Assessment and Plan


Plan: 





1 acute kidney failure: With acute kidney injury most likely from ATN no sign of

obstructive uropathy or kidney stone at this point, this could be reaction to 

either antibiotics, dehydration or reaction to the rabies shot.  Continue 

hydration, nephrology consult appreciated.  Repeat lab work in the morning. 





2 severe cellulitis and cat scratch and bite of the right upper extremity: 

Patient had finished her course of antibiotic despite the reaction to the 

penicillin toward the end she finished the seventh course.  Patient completed 

rabies vaccine on Monday.





3 COPD: Still on Ventolin HFA as needed refuses steroid inhaler at this point.





4 hypertension: Has been on lisinopril 5 mg a day.





5 borderline diabetes: Continue Accu-Chek with sliding scales coverage.





6 elevated lipase with no sign of pancreatitis repeat lipase and amylase in 24 

hours at this point continue conservative management.





7 history of uterine cancer post hysterectomy.





8 DVT prophylaxis: Patient will be on heparin subcutaneous.





9 GI prophylaxis: Patient be on Pepcid 20 mg daily.





CODE STATUS: Full code.





Discharge plan: Return home on Saturday.





Impression and plan of care have been directed as dictated by the signing 

physician.  Cyndi Red nurse practitioner acting as scribe for signing 

physician.

## 2020-06-06 VITALS
SYSTOLIC BLOOD PRESSURE: 134 MMHG | RESPIRATION RATE: 18 BRPM | HEART RATE: 82 BPM | TEMPERATURE: 98.1 F | DIASTOLIC BLOOD PRESSURE: 67 MMHG

## 2020-06-06 LAB
ANION GAP SERPL CALC-SCNC: 7 MMOL/L
BUN SERPL-SCNC: 17 MG/DL (ref 7–17)
CALCIUM SPEC-MCNC: 9.2 MG/DL (ref 8.4–10.2)
CHLORIDE SERPL-SCNC: 108 MMOL/L (ref 98–107)
CO2 SERPL-SCNC: 26 MMOL/L (ref 22–30)
GLUCOSE SERPL-MCNC: 94 MG/DL (ref 74–99)
POTASSIUM SERPL-SCNC: 4.8 MMOL/L (ref 3.5–5.1)
SODIUM SERPL-SCNC: 141 MMOL/L (ref 137–145)

## 2020-06-06 RX ADMIN — LORATADINE SCH MG: 10 TABLET ORAL at 09:10

## 2020-06-06 RX ADMIN — FAMOTIDINE SCH MG: 20 TABLET, FILM COATED ORAL at 09:10

## 2020-06-06 NOTE — P.DS
Providers


Date of admission: 


06/03/20 18:36





Expected date of discharge: 06/06/20


Attending physician: 


Esteban Jasso





Consults: 





                                        





06/03/20 18:36


Consult Physician Urgent 


   Consulting Provider: Angélica Gerard


   Consult Reason/Comments: noel


   Do you want consulting provider notified?: Yes





06/04/20 05:38


Consult Physician Routine 


   Consulting Provider: Angélica Gerard


   Consult Reason/Comments: AKF


   Do you want consulting provider notified?: Yes











Primary care physician: 


Wilfredo Grant MD





Hospital Course: 





79-year-old female one of Dr. Young (changed to Dr. Grant as she had not followed

with him in 3 years) patient who was hospitalized on May 20 until May 20 14,024 

right upper extremity acute cat bite and scratch with severe cellulitis.  

Patient was on Unasyn and had rabies series of shots.  Left the hospital doing 

well with creatinine 0.9 at the time.  Patient apparently developed to have 

significant side effect to Augmentin which was sent home on and contacted  

Sayed stop the antibiotic according to her she described significant reaction 

with rash all over with slight itching and irritation.  She was in to see her 

primary care physician today why she wasn't feeling and had severe abnormal 

creatinine with her blood test running over 3.0 patient is making less urine 

output slight difficulty with urination as well.  Patient was referred to demurs

department for acute kidney injury severe dehydration and possible obstructive 

uropathy.  Ended up having CT of the abdomen and pelvis with findings suspected 

for mild subacute compression time of fracture of the L1 vertebrae with no renal

stone or hydronephrosis but had mild hepatic cirrhosis on the finding with a CAT

scan.  Ultrasound of the kidney showed suboptimal study with evidence of chronic

medical renal disease with no gross hydronephrosis bilaterally.  She was started

on hydration will be admitted to the hospital consult nephrology watch her urine

output.  Mild incidental finding of elevated lipase with no sign or symptom of 

pancreatitis.





6/4: Repeat blood work this morning reveals a normal CBC, BUN 14 creatinine 2.3 

to, potassium 4.5, chloride 111, CO2 21.  Liver function tests are normal.  

Patient states she received her last rabies vaccine on Monday.  Patient is 

currently on IV fluids at 125 mL per hour.  Nephrology is on consult.  

Coronavirus PCR not detected.  Patient has been afebrile, heart rate 83, blood 

pressure 139/65 and pulse ox 98% on room air.





6/5: Repeat blood work today reveals sodium 141, potassium 4.5, chloride 113, 

CO2 22, BUN 25 creatinine 1.35.  The patient denies any complaints other than 

being uncomfortable with her bed.  She is eating and drinking adequately as well

as has good urine output.  She has been continued on IV fluids.  She was seen by

Dr. Gerard with recommendations to continue holding Ace inhibitor.  Urinalysis 

was clear with nitrate leukoesterase and protein negative.  Anticipate discharge

home tomorrow.  Repeat lab work ordered for the morning.





6/6: Patient denies any new complaints isn't anxious to be discharged home.  She

has been afebrile, heart rate 82, blood pressure 134/67 and pulse ox 97% on room

air. Repeat lab work reveals sodium 141, potassium 4.8, chloride 108, CO2 26, 

BUN 17 and creatinine 1.14. Nephrology has cleared the patient for discharge 

home today and patient may resume lisinopril blood pressure as needed.  We will 

plan to discontinue lisinopril and Dyazide and start amlodipine.  Patient will 

be discharged home today in stable condition.  She has been instructed to 

follow-up in the office on Tuesday for repeat lab work.








Discharge diagnoses:


1 acute kidney failure from ATN, resolved.


2 severe cellulitis and cat scratch and bite of the right upper extremity 

completed rabies vaccine schedule and antibiotics.


3 COPD


4 hypertension:


5 borderline diabetes


6 elevated lipase with no sign of pancreatitis


7 history of uterine cancer post hysterectomy.


8 COVID-19 infection not present.





Discharge plan: home.





Impression and plan of care have been directed as dictated by the signing 

physician.  Cyndi Red nurse practitioner acting as scribe for signing 

physician.








Patient Condition at Discharge: Good





Plan - Discharge Summary


New Discharge Prescriptions: 


New


   amLODIPine [Norvasc] 5 mg PO DAILY #30 tab





Continue


   Cetirizine HCl 10 mg PO DAILY


   Aspirin EC [Ecotrin] 325 mg PO DAILY PRN


     PRN Reason: Pain


   Mupirocin 2% Oint [Bactroban 2% Oint] 1 applic TOPICAL TID #60 gram


   Famotidine [Pepcid] 20 mg PO DAILY #30 tab


   oxyCODONE-APAP 5-325MG [Percocet 5-325 mg] 1 each PO Q4HR PRN #12 tab


     PRN Reason: Severe Pain


   Acetaminophen Tab [Tylenol] 650 mg PO Q6HR PRN  tab


     PRN Reason: Mild Pain Or Fever > 100.5


   Albuterol Sulfate [Ventolin HFA] 2 puff INHALATION Q6H PRN #1 inhaler


     PRN Reason: Shortness Of Breath





Discontinued


   Lisinopril [Zestril] 5 mg PO DAILY #30 tab


   Triamterene-Hctz 37.5-25Mg [Dyazide 37.5-25 Capsule] 1 cap PO DAILY #30 

capsule


Discharge Medication List





Aspirin EC [Ecotrin] 325 mg PO DAILY PRN 05/20/20 [History]


Cetirizine HCl 10 mg PO DAILY 05/20/20 [History]


Acetaminophen Tab [Tylenol] 650 mg PO Q6HR PRN  tab 05/24/20 [Rx]


Albuterol Sulfate [Ventolin HFA] 2 puff INHALATION Q6H PRN #1 inhaler 05/24/20 

[Rx]


Famotidine [Pepcid] 20 mg PO DAILY #30 tab 05/24/20 [Rx]


Mupirocin 2% Oint [Bactroban 2% Oint] 1 applic TOPICAL TID #60 gram 05/24/20 

[Rx]


oxyCODONE-APAP 5-325MG [Percocet 5-325 mg] 1 each PO Q4HR PRN #12 tab 05/24/20 

[Rx]


amLODIPine [Norvasc] 5 mg PO DAILY #30 tab 06/06/20 [Rx]








Follow up Appointment(s)/Referral(s): 


Wilfredo Grant MD [Primary Care Provider] - 1-2 days (Tuesday)


[facility], [NON-STAFF] - As Needed


Patient Instructions/Handouts:  Acute Kidney Injury (DC)


Discharge Disposition: HOME SELF-CARE

## 2020-08-04 ENCOUNTER — HOSPITAL ENCOUNTER (EMERGENCY)
Dept: HOSPITAL 47 - EC | Age: 80
Discharge: HOME | End: 2020-08-04
Payer: MEDICARE

## 2020-08-04 VITALS — SYSTOLIC BLOOD PRESSURE: 122 MMHG | HEART RATE: 81 BPM | DIASTOLIC BLOOD PRESSURE: 74 MMHG | TEMPERATURE: 98 F

## 2020-08-04 VITALS — RESPIRATION RATE: 18 BRPM

## 2020-08-04 DIAGNOSIS — I10: ICD-10-CM

## 2020-08-04 DIAGNOSIS — Z85.9: ICD-10-CM

## 2020-08-04 DIAGNOSIS — Z88.0: ICD-10-CM

## 2020-08-04 DIAGNOSIS — E78.5: ICD-10-CM

## 2020-08-04 DIAGNOSIS — Z79.891: ICD-10-CM

## 2020-08-04 DIAGNOSIS — F41.9: Primary | ICD-10-CM

## 2020-08-04 DIAGNOSIS — M19.90: ICD-10-CM

## 2020-08-04 DIAGNOSIS — Z79.899: ICD-10-CM

## 2020-08-04 DIAGNOSIS — Z87.891: ICD-10-CM

## 2020-08-04 DIAGNOSIS — M54.9: ICD-10-CM

## 2020-08-04 DIAGNOSIS — G89.29: ICD-10-CM

## 2020-08-04 DIAGNOSIS — Z88.5: ICD-10-CM

## 2020-08-04 PROCEDURE — 96372 THER/PROPH/DIAG INJ SC/IM: CPT

## 2020-08-04 PROCEDURE — 93005 ELECTROCARDIOGRAM TRACING: CPT

## 2020-08-04 PROCEDURE — 99284 EMERGENCY DEPT VISIT MOD MDM: CPT

## 2020-08-04 NOTE — ED
General Adult HPI





- General


Chief complaint: Recheck/Abnormal Lab/Rx


Stated complaint: ABD BP


Time Seen by Provider: 20 09:00


Source: patient, EMS, RN notes reviewed, old records reviewed


Mode of arrival: EMS


Limitations: no limitations





- History of Present Illness


Initial comments: 





this is a 79-year-old female presents emergency Department concerned because her

blood pressure was in the 80s and then it was up to 188 and was back down to the

80s and was back up to 200 she was cough.  Patient denies headache patient 

denies numbness weakness.  Patient denies abdominal pain patient denies nausea 

vomiting or diarrhea.  Patient denies any swelling to legs or calf tenderness.  

Patient states she normally takes her blood pressure got less concerned so she 

decided come in. Patient states she's also been very anxious lately because a 

lot of things in her life gone wrong over the last week and it made her 

extremely anxious.  Patient denies any chest pain difficult breathing shortness 

of breath.  Patient states she has chronic back pain but it's no worse than it 

always is.  Patient denies any recent fever chills or cough





- Related Data


                                Home Medications











 Medication  Instructions  Recorded  Confirmed


 


Atorvastatin Calcium [Lipitor] 40 mg PO HS 20


 


amLODIPine [Norvasc] 10 mg PO HS 20


 


diphenhydrAMINE [Benadryl] 25 mg PO HS PRN 20


 


traMADol HCL 50 mg PO Q4H PRN 20











                                    Allergies











Allergy/AdvReac Type Severity Reaction Status Date / Time


 


amoxicillin [From Augmentin] Allergy  Rash/Hives Verified 20 09:45


 


clavulanic acid Allergy  Rash/Hives Verified 20 09:45





[From Augmentin]     


 


codeine Allergy  Confusion, Verified 20 09:45





   HEADACHE  














Review of Systems


ROS Statement: 


Those systems with pertinent positive or pertinent negative responses have been 

documented in the HPI.





ROS Other: All systems not noted in ROS Statement are negative.





Past Medical History


Past Medical History: Cancer, COPD, Hyperlipidemia, Hypertension, Osteoarthritis

(OA), Skin Disorder


Additional Past Medical History / Comment(s): kidney stones


History of Any Multi-Drug Resistant Organisms: None Reported


Past Surgical History: Appendectomy, Hysterectomy


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

by Dr. Lynn in at [facility] in [place], kidney stones


Past Anesthesia/Blood Transfusion Reactions: No Reported Reaction


Past Psychological History: Anxiety


Smoking Status: Former smoker


Past Alcohol Use History: Rare


Past Drug Use History: None Reported





- Past Family History


  ** Mother


Family Medical History: Cancer


Additional Family Medical History / Comment(s): Mother  at age 85 from a 

brain tumor.





  ** Father


Family Medical History: CVA/TIA


Additional Family Medical History / Comment(s): Father  at age 75 from a 

CVA.





  ** Sister(s)


Additional Family Medical History / Comment(s): Patient has 1 sister but she has

no contact with her as her sister stole her identity.





  ** Brother(s)


Additional Family Medical History / Comment(s): Patient does not have any 

brothers.  Patient has 4 children, one  at age 7 weeks, other children with 

no major medical problems.  Patient is also adopted 1 child.





General Exam





- General Exam Comments


Initial Comments: 





GENERAL:


Patient is well-developed and well-nourished.  Patient is nontoxic and well-

hydrated and is in no acute distress.





ENT:


Neck is soft and supple.  No significant lymphadenopathy is noted.  Oropharynx 

is clear.  Moist mucous membranes.  Neck has full range of motion without 

eliciting any pain.  





EYES:


The sclera were anicteric and conjunctiva were pink and moist.  Extraocular 

movements were intact and pupils were equal round and reactive to light.  

Eyelids were unremarkable.





PULMONARY:


Unlabored respirations.  Good breath sounds bilaterally.  No audible rales 

rhonchi or wheezing was noted.





CARDIOVASCULAR:


There is a regular rate and rhythm without any murmurs gallops or rubs. 





ABDOMEN:


Soft and nontender with normal bowel sounds.





SKIN:


Skin is clear with no lesions or rashes and otherwise unremarkable.





NEUROLOGIC:


Patient is alert and oriented x3.  Cranial nerves II through XII are grossly 

intact.  Motor and sensory are also intact.  Normal speech, volume and content. 

Symmetrical smile.  





MUSCULOSKELETAL:


Normal extremities with adequate strength and full range of motion.  





LYMPHATICS:


No significant lymphadenopathy is noted





PSYCHIATRIC:


Patient is very anxious.  


Limitations: no limitations





Course


                                   Vital Signs











  20





  08:57 09:04 09:28


 


Temperature 97.8 F  


 


Pulse Rate 88  85


 


Pulse Rate [  88 





Apical]   


 


Respiratory 18  18





Rate   


 


Blood Pressure 146/69  141/65


 


O2 Sat by Pulse 98  99





Oximetry   














  20





  10:04


 


Temperature 98 F


 


Pulse Rate 81


 


Pulse Rate [ 





Apical] 


 


Respiratory 18





Rate 


 


Blood Pressure 122/74


 


O2 Sat by Pulse 95





Oximetry 














Medical Decision Making





- Medical Decision Making





EKG shows normal sinus rhythm at 80 bpm NH interval is 162 QRS is 98 QT interval

is 42 QTC is 463 per patient's EKG shows no ST segment elevation or depression.





Disposition


Clinical Impression: 


 Anxiety





Disposition: HOME SELF-CARE


Instructions (If sedation given, give patient instructions):  Anxiety (ED)


Is patient prescribed a controlled substance at d/c from ED?: No


Referrals: 


Wilfredo Grant MD [Primary Care Provider] - 1-2 days


Time of Disposition: 10:01

## 2020-09-21 ENCOUNTER — HOSPITAL ENCOUNTER (OUTPATIENT)
Dept: HOSPITAL 47 - RADECHMAIN | Age: 80
Discharge: HOME | End: 2020-09-21
Attending: INTERNAL MEDICINE
Payer: MEDICARE

## 2020-09-21 DIAGNOSIS — I07.1: Primary | ICD-10-CM

## 2020-09-21 PROCEDURE — 93306 TTE W/DOPPLER COMPLETE: CPT

## 2020-09-21 NOTE — ECHOF
Referral Reason:R06.02 Shortness of breath



MEASUREMENTS

--------

HEIGHT: 160.0 cm

WEIGHT: 82.1 kg

BP: 

IVSd:   1.1 cm     (0.6 - 1.1)

LVIDd:   3.7 cm     (3.9 - 5.3)

LVPWd:   1.2 cm     (0.6 - 1.1)

IVSs:   1.5 cm

LVIDs:   2.2 cm

LVPWs:   1.5 cm

RVIDd:   3.0 cm     (< 3.3)

LAESV Index (A-L):   19.44 ml/m

MV E Preston:   0.75 m/s

MV DecT:   211 ms

MV A Preston:   1.07 m/s

MV E/A Ratio:   0.69 

RAP:   5.00 mmHg

RVSP:   24.65 mmHg







FINDINGS

--------

This was a technically difficult study with suboptimal views.

The left ventricular size is normal.   There is mild concentric left ventricular hypertrophy.   Overa
ll left ventricular systolic function is normal with, an EF between 55 - 60 %.

The right ventricle is normal in size.

Normal LA  size by volume 22+/-6 ml/m2.

The right atrium was not well visualized.

5.0mg of Lumason was utilized for enhancement of images

Interatrial and interventricular septum intact.

The aortic valve was not well visualized.   There is no evidence of aortic regurgitation.   There is 
no evidence of aortic stenosis.

The mitral valve was not well visualized.   No mitral regurgitation.

The tricuspid valve was not well visualized.   Mild tricuspid regurgitation present.   There is no ev
idence of pulmonary hypertension.   The right ventricular systolic pressure, as measured by Doppler, 
is 24.65mmHg.

The pulmonic valve was not well visualized.

IVC Not well visulized.

There is no pericardial effusion.



CONCLUSIONS

--------

1. The left ventricular size is normal.

2. There is mild concentric left ventricular hypertrophy.

3. Overall left ventricular systolic function is normal with, an EF between 55 - 60 %.

4. Mild tricuspid regurgitation present.





SONOGRAPHER: Edita [facility]

## 2020-11-03 ENCOUNTER — HOSPITAL ENCOUNTER (OUTPATIENT)
Dept: HOSPITAL 47 - RADUSWWP | Age: 80
Discharge: HOME | End: 2020-11-03
Attending: INTERNAL MEDICINE
Payer: MEDICARE

## 2020-11-03 DIAGNOSIS — K74.60: ICD-10-CM

## 2020-11-03 DIAGNOSIS — K76.89: Primary | ICD-10-CM

## 2020-11-03 DIAGNOSIS — N28.1: ICD-10-CM

## 2020-11-03 LAB
AFP-TM SERPL-MCNC: 3.5 NG/ML (ref 0–7.9)
ALBUMIN SERPL-MCNC: 4.1 G/DL (ref 3.5–5)
ALP SERPL-CCNC: 96 U/L (ref 38–126)
ALT SERPL-CCNC: 16 U/L (ref 4–34)
ANION GAP SERPL CALC-SCNC: 5 MMOL/L
AST SERPL-CCNC: 34 U/L (ref 14–36)
BASOPHILS # BLD AUTO: 0.1 K/UL (ref 0–0.2)
BASOPHILS NFR BLD AUTO: 1 %
BUN SERPL-SCNC: 20 MG/DL (ref 7–17)
CALCIUM SPEC-MCNC: 9.2 MG/DL (ref 8.4–10.2)
CHLORIDE SERPL-SCNC: 105 MMOL/L (ref 98–107)
CO2 SERPL-SCNC: 29 MMOL/L (ref 22–30)
EOSINOPHIL # BLD AUTO: 0.5 K/UL (ref 0–0.7)
EOSINOPHIL NFR BLD AUTO: 8 %
ERYTHROCYTE [DISTWIDTH] IN BLOOD BY AUTOMATED COUNT: 4.17 M/UL (ref 3.8–5.4)
ERYTHROCYTE [DISTWIDTH] IN BLOOD: 13.9 % (ref 11.5–15.5)
GLUCOSE SERPL-MCNC: 91 MG/DL (ref 74–99)
HCT VFR BLD AUTO: 42.3 % (ref 34–46)
HGB BLD-MCNC: 12.9 GM/DL (ref 11.4–16)
INR PPP: 0.9 (ref ?–1.2)
LYMPHOCYTES # SPEC AUTO: 1.8 K/UL (ref 1–4.8)
LYMPHOCYTES NFR SPEC AUTO: 29 %
MCH RBC QN AUTO: 31 PG (ref 25–35)
MCHC RBC AUTO-ENTMCNC: 30.5 G/DL (ref 31–37)
MCV RBC AUTO: 101.6 FL (ref 80–100)
MONOCYTES # BLD AUTO: 0.3 K/UL (ref 0–1)
MONOCYTES NFR BLD AUTO: 5 %
NEUTROPHILS # BLD AUTO: 3.5 K/UL (ref 1.3–7.7)
NEUTROPHILS NFR BLD AUTO: 56 %
PLATELET # BLD AUTO: 235 K/UL (ref 150–450)
POTASSIUM SERPL-SCNC: 4.5 MMOL/L (ref 3.5–5.1)
PROT SERPL-MCNC: 8 G/DL (ref 6.3–8.2)
PT BLD: 9.5 SEC (ref 9–12)
SODIUM SERPL-SCNC: 139 MMOL/L (ref 137–145)
WBC # BLD AUTO: 6.3 K/UL (ref 3.8–10.6)

## 2020-11-03 PROCEDURE — 80074 ACUTE HEPATITIS PANEL: CPT

## 2020-11-03 PROCEDURE — 36415 COLL VENOUS BLD VENIPUNCTURE: CPT

## 2020-11-03 PROCEDURE — 80053 COMPREHEN METABOLIC PANEL: CPT

## 2020-11-03 PROCEDURE — 76705 ECHO EXAM OF ABDOMEN: CPT

## 2020-11-03 PROCEDURE — 85025 COMPLETE CBC W/AUTO DIFF WBC: CPT

## 2020-11-03 PROCEDURE — 85610 PROTHROMBIN TIME: CPT

## 2020-11-03 PROCEDURE — 82105 ALPHA-FETOPROTEIN SERUM: CPT

## 2020-11-03 NOTE — US
EXAMINATION TYPE: US liver

 

DATE OF EXAM: 11/3/2020

 

COMPARISON: CT 7/31/2020

 

CLINICAL HISTORY: 80-year-old female K74.60 Unspecified cirrhosis of liver

 

Technique: Multiple sonographic images of the right upper quadrant are obtained.

 

FINDINGS:

EXAM MEASUREMENTS:

 

Liver Length:  11.3 cm   

Gallbladder Wall:  0.3 cm   

CBD:  0.3 cm

Right Kidney:  11.0 x 4.1 x 4.8 cm

 

 

 

Pancreas:  Suboptimal visualization of the pancreatic tail due to shadowing from bowel gas. Visualize
d portions show no gross  abnormality.

Liver: Subtle contour nodularity and heterogeneous appearance. No focal lesion seen.

Gallbladder:  Borderline wall thickness. However, no abnormal distention, shadowing calculus, or surr
ounding fluid.

**Evidence for sonographic Vega's sign: no

CBD:  wnl 

Right Kidney: Cyst measuring 3.5 x 2.8 x 3.3cm at the lower pole. No hydronephrosis.

 

 

 

IMPRESSION: 

1. Heterogeneous liver with nodular contour. Correlate for underlying cysts cirrhosis.

2. No gallstones or evidence for acute cholecystitis. No biliary ductal dilatation.

3. Incidental 3.5 cm benign cyst lower pole right kidney.

## 2021-01-15 ENCOUNTER — HOSPITAL ENCOUNTER (OUTPATIENT)
Dept: HOSPITAL 47 - LABWHC1 | Age: 81
Discharge: HOME | End: 2021-01-15
Attending: INTERNAL MEDICINE
Payer: MEDICARE

## 2021-01-15 DIAGNOSIS — I10: Primary | ICD-10-CM

## 2021-01-15 LAB
ALBUMIN SERPL-MCNC: 4.8 G/DL (ref 3.8–4.9)
ALBUMIN/GLOB SERPL: 1.45 G/DL (ref 1.6–3.17)
ALP SERPL-CCNC: 96 U/L (ref 41–126)
ALT SERPL-CCNC: 26 U/L (ref 8–44)
ANION GAP SERPL CALC-SCNC: 5.7 MMOL/L (ref 4–12)
AST SERPL-CCNC: 39 U/L (ref 13–35)
BASOPHILS # BLD AUTO: 0.1 K/UL (ref 0–0.2)
BASOPHILS NFR BLD AUTO: 1 %
BUN SERPL-SCNC: 27 MG/DL (ref 9–27)
BUN/CREAT SERPL: 22.5 RATIO (ref 12–20)
CALCIUM SPEC-MCNC: 10 MG/DL (ref 8.7–10.3)
CHLORIDE SERPL-SCNC: 104 MMOL/L (ref 96–109)
CHOLEST SERPL-MCNC: 207 MG/DL (ref 0–200)
CO2 SERPL-SCNC: 28.3 MMOL/L (ref 21.6–31.8)
EOSINOPHIL # BLD AUTO: 0.6 K/UL (ref 0–0.7)
EOSINOPHIL NFR BLD AUTO: 8 %
ERYTHROCYTE [DISTWIDTH] IN BLOOD BY AUTOMATED COUNT: 4.27 M/UL (ref 3.8–5.4)
ERYTHROCYTE [DISTWIDTH] IN BLOOD: 13.1 % (ref 11.5–15.5)
GLOBULIN SER CALC-MCNC: 3.3 G/DL (ref 1.6–3.3)
GLUCOSE SERPL-MCNC: 96 MG/DL (ref 70–110)
HCT VFR BLD AUTO: 41.1 % (ref 34–46)
HDLC SERPL-MCNC: 86 MG/DL (ref 40–60)
HGB BLD-MCNC: 13.4 GM/DL (ref 11.4–16)
LDLC SERPL CALC-MCNC: 101.4 MG/DL (ref 0–131)
LYMPHOCYTES # SPEC AUTO: 2.2 K/UL (ref 1–4.8)
LYMPHOCYTES NFR SPEC AUTO: 29 %
MCH RBC QN AUTO: 31.4 PG (ref 25–35)
MCHC RBC AUTO-ENTMCNC: 32.6 G/DL (ref 31–37)
MCV RBC AUTO: 96.1 FL (ref 80–100)
MONOCYTES # BLD AUTO: 0.4 K/UL (ref 0–1)
MONOCYTES NFR BLD AUTO: 5 %
NEUTROPHILS # BLD AUTO: 4.2 K/UL (ref 1.3–7.7)
NEUTROPHILS NFR BLD AUTO: 55 %
PLATELET # BLD AUTO: 214 K/UL (ref 150–450)
POTASSIUM SERPL-SCNC: 5 MMOL/L (ref 3.5–5.5)
PROT SERPL-MCNC: 8.1 G/DL (ref 6.2–8.2)
SODIUM SERPL-SCNC: 138 MMOL/L (ref 135–145)
TRIGL SERPL-MCNC: 98 MG/DL (ref 0–149)
VLDLC SERPL CALC-MCNC: 19.6 MG/DL (ref 5–40)
WBC # BLD AUTO: 7.6 K/UL (ref 3.8–10.6)

## 2021-01-15 PROCEDURE — 85025 COMPLETE CBC W/AUTO DIFF WBC: CPT

## 2021-01-15 PROCEDURE — 80053 COMPREHEN METABOLIC PANEL: CPT

## 2021-01-15 PROCEDURE — 80061 LIPID PANEL: CPT

## 2021-01-15 PROCEDURE — 82306 VITAMIN D 25 HYDROXY: CPT

## 2021-01-15 PROCEDURE — 36415 COLL VENOUS BLD VENIPUNCTURE: CPT

## 2021-01-15 PROCEDURE — 82105 ALPHA-FETOPROTEIN SERUM: CPT

## 2021-03-10 ENCOUNTER — HOSPITAL ENCOUNTER (OUTPATIENT)
Dept: HOSPITAL 47 - RADBDWWP | Age: 81
Discharge: HOME | End: 2021-03-10
Attending: INTERNAL MEDICINE
Payer: MEDICARE

## 2021-03-10 DIAGNOSIS — Z12.31: Primary | ICD-10-CM

## 2021-03-10 PROCEDURE — 77080 DXA BONE DENSITY AXIAL: CPT

## 2021-03-10 PROCEDURE — 77067 SCR MAMMO BI INCL CAD: CPT

## 2021-03-10 NOTE — BD
EXAMINATION TYPE: Axial Bone Density

 

DATE OF EXAM: 3/10/2021

 

COMPARISON: NONE

 

CLINICAL HISTORY: 80 YR OLD FEMALE....ICD-10 CODE:   M81.0    A.R. OSTEOPOROSIS 

 

 

Height:  62.4

Weight:  188

 

FRAX RISK QUESTIONS:

Glucocorticoids (More than 3mos):  YES

           (Ex: prednisone, prednisolone, methylprednisolone, dexamethasone, and hydrocortisone).    
     

History of Fracture in Adulthood: YES

Secondary Osteoporosis: YES

    3.  Menopause before 45: YES

  

RISK FACTORS 

HISTORY OF: 

HX OF COCCYX BONE FX.....BOTH ANKLES, BACK STRAINS, T-SPINE INJURIES, TOES, FEET > 50 YRS OLD

Family History of Osteoporosis: YES, MOTHER WITH FXS

Postmenopausal woman: MENOPAUSE AT 41 YRS OLD, HYST AT 29

Lost more than 2 inches in height since high school: YES

Frequent falls: UNSTEADY

Hyperparathyroidism: UNSURE

Adrenal Insufficiency: ?

 

MEDICATIONS: 

Prednisone or other steroids: PREDNISONE, FOR LUNGS AND PAIN, COPD INHALERS, FOR MANY MANY YRS

Additional Medications: BP MEDS, STATIN FOR CHOLESTEROL, CALCIUM, PAIN MEDS, NSAIDS

Additional History: HYPERTENSION, ARTHRITIS, CHOLESTEROL,  

 

EXAM MEASUREMENTS: 

Bone mineral densitometry was performed using the Cono-C System.

Bone mineral density as measured about the Lumbar spine is:  

----- L1-L4(G/cm2): 1.364

T Score Values are as follows:

----- L1:  2.8

----- L2:  0.7

----- L3:  0.8

----- L4:  2.1

----- L1-L4:  1.5

Bone mineral density     FIRST DEXA AT [facility]

 

Bone mineral density about the R hip (g/cm2): 0.903

Bone mineral density about the L hip (g/cm2):  0.894

T Score values are as follows:

-----R Neck:  -1.6

-----L Neck:  -1.5

-----R Total:  -0.8

-----L Total:  -0.9

Bone mineral density    FIRST DEXA AT [facility]

 

FRAX%s:    THERE IS A 43.1% CHANCE FOR A MAJOR OSTEOPOROTIC FX AND A 25.9% FOR HIP......PROBABILITY F
OR FX IN 10 YRS TIME

 

 

 

IMPRESSION:

Osteopenia (T Score between -2.5 and -1).

 

There is slightly increased risk of fracture and the patient may be considered 

for treatment. 

 

Re-Screen 2-5 years.

 

NOTE:  T-SCORE=SD OF THE YOUNG ADULT MEAN.

## 2021-03-12 NOTE — MM
Reason for exam: screening  (asymptomatic).



History:

Patient is postmenopausal and has history of other cancer at age 29.

Excisional biopsy of both breasts.

Took hormonal contraceptives for 4 years.



Physical Findings:

A clinical breast exam by your physician is recommended on an annual basis and 

results should be correlated with mammographic findings.



MG Screening Mammo w CAD

Bilateral CC and MLO view(s) were taken.

The breast tissue is heterogeneously dense. This may lower the sensitivity of 

mammography.  Grouped calcifications subareolar right breast. Magnification views 

for further evaluation. Otherwise, no discrete abnormality.





ASSESSMENT: Incomplete: need additional imaging evaluation, BI-RAD 0



RECOMMENDATION:

Special view mammogram of the right breast.

(magnification views)



[facility] will attempt to contact patient to return for supplemental 

views.

## 2021-03-17 ENCOUNTER — HOSPITAL ENCOUNTER (OUTPATIENT)
Dept: HOSPITAL 47 - RADMAMWWP | Age: 81
Discharge: HOME | End: 2021-03-17
Attending: INTERNAL MEDICINE
Payer: MEDICARE

## 2021-03-17 DIAGNOSIS — Z78.0: Primary | ICD-10-CM

## 2021-03-17 PROCEDURE — 77065 DX MAMMO INCL CAD UNI: CPT

## 2021-03-17 NOTE — MM
Reason for exam: additional evaluation requested from abnormal screening.

Last mammogram was performed less than 1 month ago.



History:

Patient is postmenopausal and has history of other cancer at age 29.

Excisional biopsy of both breasts.

Took hormonal contraceptives for 4 years.



Physical Findings:

Nurse did not find any significant physical abnormalities on exam.



MG Work Up Mamm w CAD RT

CC with magnification, LM with magnification, and LM view(s) were taken of the 

right breast.

Prior study comparison: March 10, 2021, bilateral MG screening mammo w CAD.

The breast tissue is heterogeneously dense. This may lower the sensitivity of 

mammography.

Finding: There are typically benign round, grouped/clustered calcifications in the

right breast, localized to skin on lateral view. No suspicious grouped 

microcalcifications remain present.



These results were verbally communicated with the patient and result sheet given 

to the patient on 3/17/21.





ASSESSMENT: Benign, BI-RAD 2



RECOMMENDATION:

Return to routine screening mammogram schedule for both breasts.

## 2021-03-23 ENCOUNTER — HOSPITAL ENCOUNTER (OUTPATIENT)
Dept: HOSPITAL 47 - CPPFTMAIN | Age: 81
End: 2021-03-23
Attending: INTERNAL MEDICINE
Payer: MEDICARE

## 2021-03-23 DIAGNOSIS — J44.9: Primary | ICD-10-CM

## 2021-03-23 PROCEDURE — 94729 DIFFUSING CAPACITY: CPT

## 2021-03-23 PROCEDURE — 94060 EVALUATION OF WHEEZING: CPT

## 2021-03-23 PROCEDURE — 94726 PLETHYSMOGRAPHY LUNG VOLUMES: CPT

## 2021-04-15 ENCOUNTER — HOSPITAL ENCOUNTER (OUTPATIENT)
Dept: HOSPITAL 47 - RADUSWWP | Age: 81
Discharge: HOME | End: 2021-04-15
Attending: INTERNAL MEDICINE
Payer: MEDICARE

## 2021-04-15 DIAGNOSIS — K74.60: Primary | ICD-10-CM

## 2021-04-15 DIAGNOSIS — K28.1: ICD-10-CM

## 2021-04-15 LAB
ALBUMIN SERPL-MCNC: 4.3 G/DL (ref 3.5–5)
ALP SERPL-CCNC: 86 U/L (ref 38–126)
ALT SERPL-CCNC: 21 U/L (ref 4–34)
ANION GAP SERPL CALC-SCNC: 5 MMOL/L
AST SERPL-CCNC: 36 U/L (ref 14–36)
BASOPHILS # BLD AUTO: 0.1 K/UL (ref 0–0.2)
BASOPHILS NFR BLD AUTO: 1 %
BUN SERPL-SCNC: 28 MG/DL (ref 7–17)
CALCIUM SPEC-MCNC: 9.3 MG/DL (ref 8.4–10.2)
CHLORIDE SERPL-SCNC: 103 MMOL/L (ref 98–107)
CO2 SERPL-SCNC: 31 MMOL/L (ref 22–30)
EOSINOPHIL # BLD AUTO: 0.6 K/UL (ref 0–0.7)
EOSINOPHIL NFR BLD AUTO: 8 %
ERYTHROCYTE [DISTWIDTH] IN BLOOD BY AUTOMATED COUNT: 4.12 M/UL (ref 3.8–5.4)
ERYTHROCYTE [DISTWIDTH] IN BLOOD: 13 % (ref 11.5–15.5)
GLUCOSE SERPL-MCNC: 99 MG/DL (ref 74–99)
HCT VFR BLD AUTO: 39.3 % (ref 34–46)
HGB BLD-MCNC: 13.3 GM/DL (ref 11.4–16)
LYMPHOCYTES # SPEC AUTO: 2.4 K/UL (ref 1–4.8)
LYMPHOCYTES NFR SPEC AUTO: 32 %
MCH RBC QN AUTO: 32.2 PG (ref 25–35)
MCHC RBC AUTO-ENTMCNC: 33.8 G/DL (ref 31–37)
MCV RBC AUTO: 95.3 FL (ref 80–100)
MONOCYTES # BLD AUTO: 0.5 K/UL (ref 0–1)
MONOCYTES NFR BLD AUTO: 6 %
NEUTROPHILS # BLD AUTO: 3.8 K/UL (ref 1.3–7.7)
NEUTROPHILS NFR BLD AUTO: 50 %
PLATELET # BLD AUTO: 227 K/UL (ref 150–450)
POTASSIUM SERPL-SCNC: 4.7 MMOL/L (ref 3.5–5.1)
PROT SERPL-MCNC: 8 G/DL (ref 6.3–8.2)
SODIUM SERPL-SCNC: 139 MMOL/L (ref 137–145)
WBC # BLD AUTO: 7.6 K/UL (ref 3.8–10.6)

## 2021-04-15 PROCEDURE — 36415 COLL VENOUS BLD VENIPUNCTURE: CPT

## 2021-04-15 PROCEDURE — 76705 ECHO EXAM OF ABDOMEN: CPT

## 2021-04-15 PROCEDURE — 80053 COMPREHEN METABOLIC PANEL: CPT

## 2021-04-15 PROCEDURE — 85025 COMPLETE CBC W/AUTO DIFF WBC: CPT

## 2021-04-15 PROCEDURE — 82105 ALPHA-FETOPROTEIN SERUM: CPT

## 2021-04-15 NOTE — US
EXAMINATION TYPE: US liver

 

DATE OF EXAM: 4/15/2021

 

COMPARISON: NONE

 

CLINICAL HISTORY: K74.60 Unspecified cirrhosis of liver. known cirrhosis, back pain

 

EXAM MEASUREMENTS:

 

Liver Length:  14.0 cm   

Gallbladder Wall:  0.2 cm   

CBD:  0.4 cm

Right Kidney:  10.9 x 3.8 x 5.2 cm

 

**bowel gas limits study

 

Pancreas:  wnl

Liver:  difficult to penetrate, lobular contour  

Gallbladder:  wnl

**Evidence for sonographic Vega's sign:  no

CBD:  wnl 

Right Kidney:  3.2 x 2.4 x 2.6cm inferior pole cyst seen

 

 

 

IMPRESSION: 

1. Superior pole right renal cyst

## 2021-07-05 ENCOUNTER — HOSPITAL ENCOUNTER (OUTPATIENT)
Dept: HOSPITAL 47 - RADNMMAIN | Age: 81
Discharge: HOME | End: 2021-07-05
Attending: ORTHOPAEDIC SURGERY
Payer: MEDICARE

## 2021-07-05 DIAGNOSIS — M43.17: ICD-10-CM

## 2021-07-05 DIAGNOSIS — M51.36: ICD-10-CM

## 2021-07-05 DIAGNOSIS — M41.86: ICD-10-CM

## 2021-07-05 DIAGNOSIS — M47.816: ICD-10-CM
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
## 2021-07-05 DIAGNOSIS — S32.010A: ICD-10-CM

## 2021-07-05 DIAGNOSIS — M48.062: Primary | ICD-10-CM

## 2021-07-05 DIAGNOSIS — E66.9: ICD-10-CM

## 2021-07-05 DIAGNOSIS — M43.16: ICD-10-CM

## 2021-07-05 DIAGNOSIS — M47.817: ICD-10-CM

## 2021-07-05 DIAGNOSIS — M51.37: ICD-10-CM

## 2021-07-05 PROCEDURE — 78306 BONE IMAGING WHOLE BODY: CPT

## 2021-07-05 PROCEDURE — 78803 RP LOCLZJ TUM SPECT 1 AREA: CPT

## 2021-07-05 NOTE — NM
EXAMINATION TYPE: NM bone scan whole body, NM bone SPECT

 

DATE OF EXAM: 7/5/2021

 

COMPARISON: Lumbar MRI from outside institution 3/26/2021, CT 7/31/2020

 

HISTORY: Low back pain

 

Delayed whole-body scanning was performed following the injection of 23.5 mCi Tc 99m MDP.  Images acq
uired 3 hours post injection. SPECT images were obtained.

 

FINDINGS: 

 

There is a bandlike area of activity on bone scan which is thought likely to correspond to T12 verteb
ral body which shows a superior endplate depression on CT and MRI. There is some mild uptake in the p
osterior elements of L4 level which may correspond to the hypertrophic facet arthropathy changes seen
 on CT and MRI.

 

Soft tissue uptake is normal. Uptake within the hips, feet, ankles, wrists and shoulders is likely de
generative.

 

IMPRESSION: 

 

Superior endplate osteoporotic compression fracture is favored, correlate with imaging studies prior 
to any intervention. Osteoarthritic changes as described.

## 2021-08-20 ENCOUNTER — HOSPITAL ENCOUNTER (OUTPATIENT)
Dept: HOSPITAL 47 - LABWHC1 | Age: 81
Discharge: HOME | End: 2021-08-20
Attending: INTERNAL MEDICINE
Payer: MEDICARE

## 2021-08-20 DIAGNOSIS — E55.9: ICD-10-CM

## 2021-08-20 DIAGNOSIS — I10: Primary | ICD-10-CM

## 2021-08-20 LAB
ALBUMIN SERPL-MCNC: 4.5 G/DL (ref 3.8–4.9)
ALBUMIN/GLOB SERPL: 1.29 G/DL (ref 1.6–3.17)
ALP SERPL-CCNC: 62 U/L (ref 41–126)
ALT SERPL-CCNC: 20 U/L (ref 8–44)
ANION GAP SERPL CALC-SCNC: 8.4 MMOL/L (ref 4–12)
AST SERPL-CCNC: 33 U/L (ref 13–35)
BUN SERPL-SCNC: 34 MG/DL (ref 9–27)
BUN/CREAT SERPL: 22.67 RATIO (ref 12–20)
CALCIUM SPEC-MCNC: 9.4 MG/DL (ref 8.7–10.3)
CHLORIDE SERPL-SCNC: 106 MMOL/L (ref 96–109)
CHOLEST SERPL-MCNC: 258 MG/DL (ref 0–200)
CO2 SERPL-SCNC: 24.6 MMOL/L (ref 21.6–31.8)
ERYTHROCYTE [DISTWIDTH] IN BLOOD BY AUTOMATED COUNT: 3.87 X 10*6/UL (ref 4.1–5.2)
ERYTHROCYTE [DISTWIDTH] IN BLOOD: 13.5 % (ref 11.5–14.5)
GLOBULIN SER CALC-MCNC: 3.5 G/DL (ref 1.6–3.3)
GLUCOSE SERPL-MCNC: 80 MG/DL (ref 70–110)
HCT VFR BLD AUTO: 39.1 % (ref 37.2–46.3)
HDLC SERPL-MCNC: 78 MG/DL (ref 40–60)
HGB BLD-MCNC: 12.4 G/DL (ref 12–15)
LDLC SERPL CALC-MCNC: 159.4 MG/DL (ref 0–131)
MAGNESIUM SPEC-SCNC: 2.2 MG/DL (ref 1.5–2.4)
MCH RBC QN AUTO: 32 PG (ref 27–32)
MCHC RBC AUTO-ENTMCNC: 31.7 G/DL (ref 32–37)
MCV RBC AUTO: 101 FL (ref 80–97)
PLATELET # BLD AUTO: 176 X 10*3/UL (ref 140–440)
POTASSIUM SERPL-SCNC: 4.7 MMOL/L (ref 3.5–5.5)
PROT SERPL-MCNC: 8 G/DL (ref 6.2–8.2)
SODIUM SERPL-SCNC: 139 MMOL/L (ref 135–145)
TRIGL SERPL-MCNC: 103 MG/DL (ref 0–149)
VLDLC SERPL CALC-MCNC: 20.6 MG/DL (ref 5–40)
WBC # BLD AUTO: 7.62 X 10*3/UL (ref 4.5–10)

## 2021-08-20 PROCEDURE — 82306 VITAMIN D 25 HYDROXY: CPT

## 2021-08-20 PROCEDURE — 36415 COLL VENOUS BLD VENIPUNCTURE: CPT

## 2021-08-20 PROCEDURE — 80061 LIPID PANEL: CPT

## 2021-08-20 PROCEDURE — 83735 ASSAY OF MAGNESIUM: CPT

## 2021-08-20 PROCEDURE — 85027 COMPLETE CBC AUTOMATED: CPT

## 2021-08-20 PROCEDURE — 84443 ASSAY THYROID STIM HORMONE: CPT

## 2021-08-20 PROCEDURE — 80053 COMPREHEN METABOLIC PANEL: CPT

## 2021-11-02 ENCOUNTER — HOSPITAL ENCOUNTER (OUTPATIENT)
Dept: HOSPITAL 47 - RADUSWWP | Age: 81
Discharge: HOME | End: 2021-11-02
Attending: NURSE PRACTITIONER
Payer: MEDICARE

## 2021-11-02 DIAGNOSIS — K74.60: ICD-10-CM

## 2021-11-02 DIAGNOSIS — N28.1: Primary | ICD-10-CM

## 2021-11-02 LAB
ALBUMIN SERPL-MCNC: 4 G/DL (ref 3.5–5)
ALP SERPL-CCNC: 83 U/L (ref 38–126)
ALT SERPL-CCNC: 18 U/L (ref 4–34)
ANION GAP SERPL CALC-SCNC: 10 MMOL/L
AST SERPL-CCNC: 35 U/L (ref 14–36)
BASOPHILS # BLD AUTO: 0.1 K/UL (ref 0–0.2)
BASOPHILS NFR BLD AUTO: 1 %
BUN SERPL-SCNC: 17 MG/DL (ref 7–17)
CALCIUM SPEC-MCNC: 9.7 MG/DL (ref 8.4–10.2)
CHLORIDE SERPL-SCNC: 107 MMOL/L (ref 98–107)
CO2 SERPL-SCNC: 23 MMOL/L (ref 22–30)
EOSINOPHIL # BLD AUTO: 0.9 K/UL (ref 0–0.7)
EOSINOPHIL NFR BLD AUTO: 13 %
ERYTHROCYTE [DISTWIDTH] IN BLOOD BY AUTOMATED COUNT: 3.86 M/UL (ref 3.8–5.4)
ERYTHROCYTE [DISTWIDTH] IN BLOOD: 12.9 % (ref 11.5–15.5)
GLUCOSE SERPL-MCNC: 104 MG/DL (ref 74–99)
HCT VFR BLD AUTO: 38.1 % (ref 34–46)
HGB BLD-MCNC: 12.2 GM/DL (ref 11.4–16)
INR PPP: 1 (ref ?–1.2)
LYMPHOCYTES # SPEC AUTO: 1.6 K/UL (ref 1–4.8)
LYMPHOCYTES NFR SPEC AUTO: 21 %
MCH RBC QN AUTO: 31.7 PG (ref 25–35)
MCHC RBC AUTO-ENTMCNC: 32.1 G/DL (ref 31–37)
MCV RBC AUTO: 98.8 FL (ref 80–100)
MONOCYTES # BLD AUTO: 0.3 K/UL (ref 0–1)
MONOCYTES NFR BLD AUTO: 4 %
NEUTROPHILS # BLD AUTO: 4.3 K/UL (ref 1.3–7.7)
NEUTROPHILS NFR BLD AUTO: 57 %
PLATELET # BLD AUTO: 255 K/UL (ref 150–450)
POTASSIUM SERPL-SCNC: 4.6 MMOL/L (ref 3.5–5.1)
PROT SERPL-MCNC: 8.3 G/DL (ref 6.3–8.2)
PT BLD: 10.3 SEC (ref 9–12)
SODIUM SERPL-SCNC: 140 MMOL/L (ref 137–145)
WBC # BLD AUTO: 7.4 K/UL (ref 3.8–10.6)

## 2021-11-02 PROCEDURE — 85610 PROTHROMBIN TIME: CPT

## 2021-11-02 PROCEDURE — 85025 COMPLETE CBC W/AUTO DIFF WBC: CPT

## 2021-11-02 PROCEDURE — 82105 ALPHA-FETOPROTEIN SERUM: CPT

## 2021-11-02 PROCEDURE — 76705 ECHO EXAM OF ABDOMEN: CPT

## 2021-11-02 PROCEDURE — 36415 COLL VENOUS BLD VENIPUNCTURE: CPT

## 2021-11-02 PROCEDURE — 80053 COMPREHEN METABOLIC PANEL: CPT

## 2021-11-02 NOTE — US
EXAMINATION TYPE: US liver

 

DATE OF EXAM: 11/2/2021

 

COMPARISON: US & CT

 

CLINICAL HISTORY: K74.60 Unspecified cirrhosis of liver. Known Cirrhosis, pain

 

EXAM MEASUREMENTS:

 

Liver Length:  11.4 cm   

Gallbladder Wall:  0.2 cm   

CBD:  0.4 cm

Right Kidney:  10.3 x 4.7 x 4.1 cm

 

 

 

Pancreas:  wnl, tail obscured by overlying bowel gas

Liver:  Lobulated contour, heterogeneous  

Gallbladder:  Possible small "gravel" stones at dependent fundus, wall not thickened

**Evidence for sonographic Vega's sign:  No

CBD:  wnl 

Right Kidney:  Cyst lower pole= 3.9 x 2.9 x 3.6 cm  

 

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

 

IMPRESSION: 

1. Fusiform prominence distal abdominal aorta measuring 3.3 cm.

2. Inferior pole right renal cyst.

3. Debris and/or gallstones within the dependent gallbladder.

4. Heterogenous lobular appearance of the liver can be compatible the patient's known cirrhosis.

## 2022-06-09 ENCOUNTER — HOSPITAL ENCOUNTER (OUTPATIENT)
Dept: HOSPITAL 47 - RADMAMWWP | Age: 82
Discharge: HOME | End: 2022-06-09
Attending: INTERNAL MEDICINE
Payer: MEDICARE

## 2022-06-09 DIAGNOSIS — R92.8: ICD-10-CM

## 2022-06-09 DIAGNOSIS — Z12.31: Primary | ICD-10-CM

## 2022-06-09 PROCEDURE — 77067 SCR MAMMO BI INCL CAD: CPT

## 2022-06-09 PROCEDURE — 77063 BREAST TOMOSYNTHESIS BI: CPT

## 2022-06-14 NOTE — MM
Reason for Exam: Screening  (asymptomatic). 

Last mammogram was performed 1 year(s) and 3 month(s) ago. 





Patient History: 

Menarche at age 15. First Full-Term Pregnancy at age 20. Hysterectomy at age 29. Postmenopausal.

Other cancer, age 29. Patient used Hormonal Contraceptives for 4 years. Bilateral Excisional Biopsy.







Risk Values: 

Azucena 5 year model risk: 1.3%.

NCI Lifetime model risk: 2.0%.





Prior Study Comparison: 

3/10/2021 Bilateral Screening Mammogram, [facility]. 3/17/2021 Right Diagnostic Mammogram, [facility]. 





Tissue Density: 

The breast tissue is heterogeneously dense. This may lower the sensitivity of mammography.





Findings: 

Analyzed By CAD. 

A few scattered benign punctate and round calcifications are redemonstrated on both sides. No

significant change from prior exams. 





Overall Assessment: Negative, BI-RAD 1





Management: 

Screening Mammogram of both breasts in 1 year.

A clinical breast exam by your physician is recommended on an annual basis and results should be

correlated with mammographic findings.  Also, the patient should continue monthly self breast exams.





Electronically signed and approved by: Vinicius Renteria M.D. Radiologist

## 2025-06-26 ENCOUNTER — HOSPITAL ENCOUNTER (OUTPATIENT)
Dept: HOSPITAL 47 - RADBDWWP | Age: 85
Discharge: HOME | End: 2025-06-26
Attending: INTERNAL MEDICINE
Payer: MEDICARE

## 2025-06-26 DIAGNOSIS — Z12.31: Primary | ICD-10-CM

## 2025-06-26 DIAGNOSIS — M81.0: ICD-10-CM

## 2025-06-26 DIAGNOSIS — R92.323: ICD-10-CM

## 2025-06-26 DIAGNOSIS — Z92.0: ICD-10-CM

## 2025-06-26 DIAGNOSIS — Z78.0: ICD-10-CM

## 2025-06-26 DIAGNOSIS — M85.89: ICD-10-CM

## 2025-06-26 LAB
ALBUMIN SERPL-MCNC: 3.8 G/DL (ref 3.5–5)
ALP SERPL-CCNC: 90 U/L (ref 38–126)
ALT SERPL-CCNC: 14 U/L (ref 4–34)
ANION GAP SERPL CALC-SCNC: 9 MMOL/L
ANISOCYTOSIS BLD QL SMEAR: (no result)
AST SERPL-CCNC: 35 U/L (ref 14–36)
BASO STIPL BLD QL SMEAR: (no result)
BASOPHILS # BLD AUTO: 0.05 10*3/UL (ref 0–0.1)
BASOPHILS NFR BLD AUTO: 0.8 %
BILIRUB BLD-MCNC: 0.8 MG/DL (ref 0.2–1.3)
BUN SERPL-SCNC: 16 MG/DL (ref 7–17)
BURR CELLS BLD QL SMEAR: (no result)
CALCIUM SPEC-MCNC: 9.1 MG/DL (ref 8.4–10.2)
CHLORIDE SERPL-SCNC: 103 MMOL/L (ref 98–107)
CO2 SERPL-SCNC: 27 MMOL/L (ref 22–30)
CREATININE: 1.12 MG/DL (ref 0.52–1.04)
DACRYOCYTES BLD QL SMEAR: (no result)
DOHLE BOD BLD QL SMEAR: (no result)
EOSINOPHIL # BLD AUTO: 0.4 10*3/UL (ref 0.04–0.35)
EOSINOPHIL NFR BLD AUTO: 6.2 %
ERYTHROCYTE [DISTWIDTH] IN BLOOD BY AUTOMATED COUNT: 3.7 10*6/UL (ref 4.1–5.2)
ERYTHROCYTE [DISTWIDTH] IN BLOOD: 13.7 % (ref 11.5–14.5)
EST. AVERAGE GLUCOSE BLD GHB EST-MCNC: 105 MG/DL
GLUCOSE SERPL-MCNC: 92 MG/DL (ref 74–99)
HCT VFR BLD AUTO: 36.9 % (ref 37.2–46.3)
HGB BLD-MCNC: 12 G/DL (ref 12–15)
HOWELL-JOLLY BOD BLD QL SMEAR: (no result)
HYPOCHROMIA BLD QL SMEAR: (no result)
IMM GRANULOCYTES # BLD: 0.01 10*3/UL (ref 0–0.04)
LDLC SERPL DIRECT ASSAY-MCNC: (no result) MG/DL
LG PLATELETS BLD QL SMEAR: (no result)
LYMPHOCYTES # SPEC AUTO: 1.92 10*3/UL (ref 0.9–5)
LYMPHOCYTES NFR SPEC AUTO: 29.8 %
Lab: (no result)
MCH RBC QN AUTO: 32.4 PG (ref 27–32)
MCHC RBC AUTO-ENTMCNC: 32.5 G/DL (ref 32–37)
MCV RBC AUTO: 99.7 FL (ref 80–97)
MONOCYTES # BLD AUTO: 0.53 10*3/UL (ref 0.2–1)
MONOCYTES NFR BLD AUTO: 8.2 %
NEUTROPHILS # BLD AUTO: 3.53 10*3/UL (ref 1.8–7.7)
NEUTROPHILS NFR BLD AUTO: 54.8 %
NEUTS HYPERSEG # BLD: (no result) 10*3/UL
NRBC BLD AUTO-RTO: (no result) %
OVALOCYTES BLD QL SMEAR: (no result)
PELGER HUET CELLS BLD QL SMEAR: (no result)
PLATELET # BLD AUTO: 170 10*3/UL (ref 140–440)
PLATELETS.RETICULATED NFR BLD AUTO: (no result) %
PMV BLD AUTO: 9.5 FL (ref 9.5–12.2)
POIKILOCYTOSIS BLD QL SMEAR: (no result)
POIKILOCYTOSIS BLD QL SMEAR: (no result)
POLYCHROMASIA BLD QL SMEAR: (no result)
POTASSIUM SERPL-SCNC: 3.8 MMOL/L (ref 3.5–5.1)
PROT SERPL-MCNC: 7.7 G/DL (ref 6.3–8.2)
RBC MORPH BLD: (no result)
ROULEAUX BLD QL SMEAR: (no result)
SCHISTOCYTES BLD QL SMEAR: (no result)
SICKLE CELLS BLD QL SMEAR: (no result)
SODIUM SERPL-SCNC: 139 MMOL/L (ref 137–145)
SPHEROCYTES BLD QL SMEAR: (no result)
STOMATOCYTES BLD QL SMEAR: (no result)
T4 FREE SERPL-MCNC: 1.2 NG/DL (ref 0.78–2.19)
TARGETS BLD QL SMEAR: (no result)
TOXIC GRANULES BLD QL SMEAR: (no result)
TSH SERPL DL<=0.005 MIU/L-ACNC: 1.34 MIU/L (ref 0.47–4.68)
VARIANT LYMPHS BLD QL SMEAR: (no result)
WBC # BLD AUTO: 6.44 10*3/UL (ref 4.5–10)
WBC NRBC COR # BLD: (no result) K/UL
WBC TOXIC VACUOLES BLD QL SMEAR: (no result)

## 2025-06-26 PROCEDURE — 77080 DXA BONE DENSITY AXIAL: CPT

## 2025-06-26 PROCEDURE — 84439 ASSAY OF FREE THYROXINE: CPT

## 2025-06-26 PROCEDURE — 83036 HEMOGLOBIN GLYCOSYLATED A1C: CPT

## 2025-06-26 PROCEDURE — 77063 BREAST TOMOSYNTHESIS BI: CPT

## 2025-06-26 PROCEDURE — 84443 ASSAY THYROID STIM HORMONE: CPT

## 2025-06-26 PROCEDURE — 77067 SCR MAMMO BI INCL CAD: CPT

## 2025-06-26 PROCEDURE — 80053 COMPREHEN METABOLIC PANEL: CPT

## 2025-06-26 PROCEDURE — 80061 LIPID PANEL: CPT

## 2025-06-26 PROCEDURE — 85025 COMPLETE CBC W/AUTO DIFF WBC: CPT

## 2025-06-27 LAB
CHOLEST SERPL-MCNC: 196 MG/DL (ref 0–200)
CHOLEST/HDLC SERPL: 3.32 RATIO
HDLC SERPL-MCNC: 59.1 MG/DL (ref 40–60)
LDLC SERPL CALC-MCNC: 110.5 MG/DL (ref 0–131)
TRIGL SERPL-MCNC: 132 MG/DL (ref 0–149)
VLDLC SERPL CALC-MCNC: 26.4 MG/DL (ref 5–40)